# Patient Record
Sex: FEMALE | Race: BLACK OR AFRICAN AMERICAN | NOT HISPANIC OR LATINO | Employment: OTHER | URBAN - METROPOLITAN AREA
[De-identification: names, ages, dates, MRNs, and addresses within clinical notes are randomized per-mention and may not be internally consistent; named-entity substitution may affect disease eponyms.]

---

## 2017-08-11 ENCOUNTER — HOSPITAL ENCOUNTER (OUTPATIENT)
Dept: CARDIOLOGY | Facility: HOSPITAL | Age: 62
Discharge: HOME | End: 2017-08-11
Attending: INTERNAL MEDICINE | Admitting: INTERNAL MEDICINE

## 2017-10-18 ENCOUNTER — HOSPITAL ENCOUNTER (OUTPATIENT)
Dept: MAMMOGRAPHY | Facility: HOSPITAL | Age: 62
Discharge: HOME | End: 2017-10-18
Attending: NURSE PRACTITIONER | Admitting: NURSE PRACTITIONER

## 2018-01-01 ENCOUNTER — APPOINTMENT (OUTPATIENT)
Dept: CT IMAGING | Facility: HOSPITAL | Age: 63
End: 2018-01-01

## 2018-01-01 ENCOUNTER — HOSPITAL ENCOUNTER (INPATIENT)
Facility: HOSPITAL | Age: 63
LOS: 3 days | Discharge: HOSPICE | End: 2018-11-08
Attending: INTERNAL MEDICINE | Admitting: INTERNAL MEDICINE

## 2018-01-01 ENCOUNTER — APPOINTMENT (OUTPATIENT)
Dept: CARDIOLOGY | Facility: HOSPITAL | Age: 63
End: 2018-01-01
Attending: INTERNAL MEDICINE

## 2018-01-01 ENCOUNTER — HOSPITAL ENCOUNTER (INPATIENT)
Facility: HOSPITAL | Age: 63
LOS: 11 days | End: 2018-11-19
Attending: INTERNAL MEDICINE | Admitting: INTERNAL MEDICINE

## 2018-01-01 VITALS
OXYGEN SATURATION: 95 % | HEART RATE: 81 BPM | RESPIRATION RATE: 10 BRPM | TEMPERATURE: 100.4 F | BODY MASS INDEX: 27.48 KG/M2 | SYSTOLIC BLOOD PRESSURE: 131 MMHG | WEIGHT: 140 LBS | DIASTOLIC BLOOD PRESSURE: 86 MMHG | HEIGHT: 60 IN

## 2018-01-01 VITALS — SYSTOLIC BLOOD PRESSURE: 104 MMHG | TEMPERATURE: 97.9 F | DIASTOLIC BLOOD PRESSURE: 75 MMHG

## 2018-01-01 LAB
ALBUMIN SERPL-MCNC: 3.7 G/DL (ref 3.5–5.2)
ALBUMIN/GLOB SERPL: 0.8 G/DL
ALP SERPL-CCNC: 102 U/L (ref 39–117)
ALT SERPL W P-5'-P-CCNC: 15 U/L (ref 1–33)
ANION GAP SERPL CALCULATED.3IONS-SCNC: 13.9 MMOL/L
AORTIC DIMENSIONLESS INDEX: 0.6 (DI)
AST SERPL-CCNC: 17 U/L (ref 1–32)
BASOPHILS # BLD AUTO: 0.02 10*3/MM3 (ref 0–0.2)
BASOPHILS NFR BLD AUTO: 0.2 % (ref 0–1.5)
BH CV ECHO MEAS - AO MAX PG: 19 MMHG
BH CV ECHO MEAS - AO MEAN PG: 10 MMHG
BH CV ECHO MEAS - AO V2 MAX: 222 CM/SEC
BH CV ECHO MEAS - AO V2 VTI: 49 CM
BH CV ECHO MEAS - EDV(MOD-SP2): 32 ML
BH CV ECHO MEAS - EDV(MOD-SP4): 64 ML
BH CV ECHO MEAS - EF(MOD-BP): 57 %
BH CV ECHO MEAS - EF(MOD-SP4): 56.3 %
BH CV ECHO MEAS - ESV(MOD-SP2): 60.5 ML
BH CV ECHO MEAS - ESV(MOD-SP4): 28 ML
BH CV ECHO MEAS - FS: 32 %
BH CV ECHO MEAS - IVS/LVPW: 0.8 CM
BH CV ECHO MEAS - IVSD: 0.8 CM
BH CV ECHO MEAS - LA DIMENSION: 1.8 CM
BH CV ECHO MEAS - LAT PEAK E' VEL: 7 CM/SEC
BH CV ECHO MEAS - LV MAX PG: 5.5 MMHG
BH CV ECHO MEAS - LV MEAN PG: 3 MMHG
BH CV ECHO MEAS - LV V1 MAX: 117 CM/SEC
BH CV ECHO MEAS - LV V1 VTI: 29 CM
BH CV ECHO MEAS - LVIDD: 4.1 CM
BH CV ECHO MEAS - LVIDS: 2.8 CM
BH CV ECHO MEAS - LVOT DIAM: 1.9 CM
BH CV ECHO MEAS - LVPWD: 1 CM
BH CV ECHO MEAS - MED PEAK E' VEL: 5 CM/SEC
BH CV ECHO MEAS - MV A DUR: 0.2 SEC
BH CV ECHO MEAS - MV A MAX VEL: 87.9 CM/SEC
BH CV ECHO MEAS - MV DEC SLOPE: 186 CM/SEC2
BH CV ECHO MEAS - MV DEC TIME: 0 MSEC
BH CV ECHO MEAS - MV E MAX VEL: 66.1 CM/SEC
BH CV ECHO MEAS - MV E/A: 0.8
BH CV ECHO MEAS - MV MAX PG: 3 MMHG
BH CV ECHO MEAS - MV MEAN PG: 1 MMHG
BH CV ECHO MEAS - MV P1/2T: 100 MSEC
BH CV ECHO MEAS - MV V2 VTI: 83 CM
BH CV ECHO MEAS - PA V2 MAX: 83 CM/SEC
BH CV ECHO MEAS - RAP SYSTOLE: 3 MMHG
BH CV ECHO MEAS - RV V1 MAX: 17 CM/SEC
BH CV ECHO MEAS - RVOT DIAM: 2.2 CM
BH CV ECHO MEAS - RVSP: 35 MMHG
BH CV ECHO MEAS - SI(MOD-SP2): 31.5 ML/M2
BH CV ECHO MEAS - SV(MOD-SP2): 49 ML
BH CV ECHO MEAS - SV(MOD-SP4): 23.2 ML
BH CV ECHO MEAS - TAPSE (>1.6): 1.4 CM2
BH CV ECHO MEAS - TR MAX PG: 32
BH CV ECHO MEAS - TR MAX VEL: 284 CM/SEC
BH CV ECHO MEASUREMENTS AVERAGE E/E' RATIO: 11.02
BH CV VAS BP RIGHT ARM: NORMAL MMHG
BH CV XLRA - RV BASE: 3.3 CM
BILIRUB SERPL-MCNC: 0.4 MG/DL (ref 0.1–1.2)
BUN BLD-MCNC: 9 MG/DL (ref 8–23)
BUN/CREAT SERPL: 11.4 (ref 7–25)
CALCIUM SPEC-SCNC: 8.9 MG/DL (ref 8.6–10.5)
CHLORIDE SERPL-SCNC: 100 MMOL/L (ref 98–107)
CHOLEST SERPL-MCNC: 182 MG/DL (ref 0–200)
CO2 SERPL-SCNC: 22.1 MMOL/L (ref 22–29)
CREAT BLD-MCNC: 0.79 MG/DL (ref 0.57–1)
DEPRECATED RDW RBC AUTO: 49.5 FL (ref 37–54)
EOSINOPHIL # BLD AUTO: 0.04 10*3/MM3 (ref 0–0.7)
EOSINOPHIL NFR BLD AUTO: 0.3 % (ref 0.3–6.2)
ERYTHROCYTE [DISTWIDTH] IN BLOOD BY AUTOMATED COUNT: 15 % (ref 11.7–13)
GFR SERPL CREATININE-BSD FRML MDRD: 73 ML/MIN/1.73
GFR SERPL CREATININE-BSD FRML MDRD: 89 ML/MIN/1.73
GLOBULIN UR ELPH-MCNC: 4.7 GM/DL
GLUCOSE BLD-MCNC: 88 MG/DL (ref 65–99)
GLUCOSE BLDC GLUCOMTR-MCNC: 104 MG/DL (ref 70–130)
GLUCOSE BLDC GLUCOMTR-MCNC: 85 MG/DL (ref 70–130)
GLUCOSE BLDC GLUCOMTR-MCNC: 93 MG/DL (ref 70–130)
HBA1C MFR BLD: 5.4 % (ref 4.8–5.6)
HCT VFR BLD AUTO: 43 % (ref 35.6–45.5)
HDLC SERPL-MCNC: 55 MG/DL (ref 40–60)
HGB BLD-MCNC: 14.3 G/DL (ref 11.9–15.5)
IMM GRANULOCYTES # BLD: 0.04 10*3/MM3 (ref 0–0.03)
IMM GRANULOCYTES NFR BLD: 0.3 % (ref 0–0.5)
LDLC SERPL CALC-MCNC: 116 MG/DL (ref 0–100)
LDLC/HDLC SERPL: 2.11 {RATIO}
LEFT ATRIUM VOLUME INDEX: 19 ML/M2
LV EF 2D ECHO EST: 57 %
LYMPHOCYTES # BLD AUTO: 2.26 10*3/MM3 (ref 0.9–4.8)
LYMPHOCYTES NFR BLD AUTO: 18.1 % (ref 19.6–45.3)
MAXIMAL PREDICTED HEART RATE: 155 BPM
MCH RBC QN AUTO: 29.7 PG (ref 26.9–32)
MCHC RBC AUTO-ENTMCNC: 33.3 G/DL (ref 32.4–36.3)
MCV RBC AUTO: 89.4 FL (ref 80.5–98.2)
MONOCYTES # BLD AUTO: 0.9 10*3/MM3 (ref 0.2–1.2)
MONOCYTES NFR BLD AUTO: 7.2 % (ref 5–12)
NEUTROPHILS # BLD AUTO: 9.26 10*3/MM3 (ref 1.9–8.1)
NEUTROPHILS NFR BLD AUTO: 74.2 % (ref 42.7–76)
NRBC BLD MANUAL-RTO: 0 /100 WBC (ref 0–0)
PLATELET # BLD AUTO: 259 10*3/MM3 (ref 140–500)
PMV BLD AUTO: 9.4 FL (ref 6–12)
POTASSIUM BLD-SCNC: 3.5 MMOL/L (ref 3.5–5.2)
PROT SERPL-MCNC: 8.4 G/DL (ref 6–8.5)
RBC # BLD AUTO: 4.81 10*6/MM3 (ref 3.9–5.2)
SODIUM BLD-SCNC: 136 MMOL/L (ref 136–145)
STRESS TARGET HR: 132 BPM
TRIGL SERPL-MCNC: 56 MG/DL (ref 0–150)
VLDLC SERPL-MCNC: 11.2 MG/DL (ref 5–40)
WBC NRBC COR # BLD: 12.48 10*3/MM3 (ref 4.5–10.7)

## 2018-01-01 PROCEDURE — 99231 SBSQ HOSP IP/OBS SF/LOW 25: CPT | Performed by: INTERNAL MEDICINE

## 2018-01-01 PROCEDURE — 25010000002 HYDROMORPHONE 1 MG/ML SOLUTION: Performed by: INTERNAL MEDICINE

## 2018-01-01 PROCEDURE — 25010000002 MORPHINE PER 10 MG: Performed by: INTERNAL MEDICINE

## 2018-01-01 PROCEDURE — 82962 GLUCOSE BLOOD TEST: CPT

## 2018-01-01 PROCEDURE — 0042T HC CT CEREBRAL PERFUSION W/WO CONTRAST: CPT

## 2018-01-01 PROCEDURE — 85025 COMPLETE CBC W/AUTO DIFF WBC: CPT | Performed by: INTERNAL MEDICINE

## 2018-01-01 PROCEDURE — 25010000002 LORAZEPAM PER 2 MG: Performed by: INTERNAL MEDICINE

## 2018-01-01 PROCEDURE — 80053 COMPREHEN METABOLIC PANEL: CPT | Performed by: INTERNAL MEDICINE

## 2018-01-01 PROCEDURE — 83036 HEMOGLOBIN GLYCOSYLATED A1C: CPT | Performed by: INTERNAL MEDICINE

## 2018-01-01 PROCEDURE — 70450 CT HEAD/BRAIN W/O DYE: CPT

## 2018-01-01 PROCEDURE — 93306 TTE W/DOPPLER COMPLETE: CPT | Performed by: INTERNAL MEDICINE

## 2018-01-01 PROCEDURE — 93306 TTE W/DOPPLER COMPLETE: CPT

## 2018-01-01 PROCEDURE — 0 IOPAMIDOL PER 1 ML: Performed by: INTERNAL MEDICINE

## 2018-01-01 PROCEDURE — 99233 SBSQ HOSP IP/OBS HIGH 50: CPT | Performed by: PSYCHIATRY & NEUROLOGY

## 2018-01-01 PROCEDURE — 80061 LIPID PANEL: CPT | Performed by: INTERNAL MEDICINE

## 2018-01-01 PROCEDURE — 99222 1ST HOSP IP/OBS MODERATE 55: CPT | Performed by: INTERNAL MEDICINE

## 2018-01-01 PROCEDURE — 99223 1ST HOSP IP/OBS HIGH 75: CPT | Performed by: PSYCHIATRY & NEUROLOGY

## 2018-01-01 RX ORDER — LORAZEPAM 2 MG/ML
1 INJECTION INTRAMUSCULAR
Status: DISCONTINUED | OUTPATIENT
Start: 2018-01-01 | End: 2018-01-01 | Stop reason: HOSPADM

## 2018-01-01 RX ORDER — SPIRONOLACTONE 25 MG/1
25 TABLET ORAL DAILY
COMMUNITY

## 2018-01-01 RX ORDER — LORAZEPAM 2 MG/ML
2 CONCENTRATE ORAL
Status: CANCELLED | OUTPATIENT
Start: 2018-01-01 | End: 2018-01-01

## 2018-01-01 RX ORDER — LORAZEPAM 2 MG/ML
0.5 INJECTION INTRAMUSCULAR
Status: DISCONTINUED | OUTPATIENT
Start: 2018-01-01 | End: 2018-01-01 | Stop reason: HOSPADM

## 2018-01-01 RX ORDER — LORAZEPAM 2 MG/ML
2 INJECTION INTRAMUSCULAR
Status: DISCONTINUED | OUTPATIENT
Start: 2018-01-01 | End: 2018-01-01 | Stop reason: HOSPADM

## 2018-01-01 RX ORDER — DIPHENOXYLATE HYDROCHLORIDE AND ATROPINE SULFATE 2.5; .025 MG/1; MG/1
1 TABLET ORAL
Status: CANCELLED | OUTPATIENT
Start: 2018-01-01

## 2018-01-01 RX ORDER — SODIUM CHLORIDE 0.9 % (FLUSH) 0.9 %
3 SYRINGE (ML) INJECTION EVERY 12 HOURS SCHEDULED
Status: DISCONTINUED | OUTPATIENT
Start: 2018-01-01 | End: 2018-01-01 | Stop reason: HOSPADM

## 2018-01-01 RX ORDER — LORAZEPAM 2 MG/ML
1 INJECTION INTRAMUSCULAR
Status: DISPENSED | OUTPATIENT
Start: 2018-01-01 | End: 2018-01-01

## 2018-01-01 RX ORDER — GLYCOPYRROLATE 0.2 MG/ML
0.4 INJECTION INTRAMUSCULAR; INTRAVENOUS
Status: DISCONTINUED | OUTPATIENT
Start: 2018-01-01 | End: 2018-01-01 | Stop reason: HOSPADM

## 2018-01-01 RX ORDER — MORPHINE SULFATE 20 MG/ML
10 SOLUTION ORAL
Status: ACTIVE | OUTPATIENT
Start: 2018-01-01 | End: 2018-01-01

## 2018-01-01 RX ORDER — LORAZEPAM 2 MG/ML
0.5 CONCENTRATE ORAL
Status: ACTIVE | OUTPATIENT
Start: 2018-01-01 | End: 2018-01-01

## 2018-01-01 RX ORDER — LORAZEPAM 2 MG/ML
1 INJECTION INTRAMUSCULAR
Status: CANCELLED | OUTPATIENT
Start: 2018-01-01 | End: 2018-01-01

## 2018-01-01 RX ORDER — MORPHINE SULFATE 20 MG/ML
20 SOLUTION ORAL
Status: DISCONTINUED | OUTPATIENT
Start: 2018-01-01 | End: 2018-01-01 | Stop reason: HOSPADM

## 2018-01-01 RX ORDER — ACETAMINOPHEN 650 MG/1
650 SUPPOSITORY RECTAL EVERY 4 HOURS PRN
Status: DISCONTINUED | OUTPATIENT
Start: 2018-01-01 | End: 2018-01-01 | Stop reason: HOSPADM

## 2018-01-01 RX ORDER — ACETAMINOPHEN 325 MG/1
650 TABLET ORAL EVERY 4 HOURS PRN
Status: CANCELLED | OUTPATIENT
Start: 2018-01-01

## 2018-01-01 RX ORDER — SODIUM CHLORIDE 0.9 % (FLUSH) 0.9 %
3 SYRINGE (ML) INJECTION EVERY 12 HOURS SCHEDULED
Status: CANCELLED | OUTPATIENT
Start: 2018-01-01

## 2018-01-01 RX ORDER — LORAZEPAM 2 MG/ML
0.5 CONCENTRATE ORAL
Status: DISCONTINUED | OUTPATIENT
Start: 2018-01-01 | End: 2018-01-01 | Stop reason: HOSPADM

## 2018-01-01 RX ORDER — SODIUM CHLORIDE 0.9 % (FLUSH) 0.9 %
3-10 SYRINGE (ML) INJECTION AS NEEDED
Status: CANCELLED | OUTPATIENT
Start: 2018-01-01

## 2018-01-01 RX ORDER — MORPHINE SULFATE 20 MG/ML
5 SOLUTION ORAL
Status: DISCONTINUED | OUTPATIENT
Start: 2018-01-01 | End: 2018-01-01 | Stop reason: HOSPADM

## 2018-01-01 RX ORDER — LORAZEPAM 2 MG/ML
0.5 CONCENTRATE ORAL
Status: CANCELLED | OUTPATIENT
Start: 2018-01-01 | End: 2018-01-01

## 2018-01-01 RX ORDER — MORPHINE SULFATE 20 MG/ML
20 SOLUTION ORAL
Status: CANCELLED | OUTPATIENT
Start: 2018-01-01 | End: 2018-01-01

## 2018-01-01 RX ORDER — SCOLOPAMINE TRANSDERMAL SYSTEM 1 MG/1
1 PATCH, EXTENDED RELEASE TRANSDERMAL
Status: CANCELLED | OUTPATIENT
Start: 2018-01-01

## 2018-01-01 RX ORDER — LORAZEPAM 2 MG/ML
1 CONCENTRATE ORAL
Status: ACTIVE | OUTPATIENT
Start: 2018-01-01 | End: 2018-01-01

## 2018-01-01 RX ORDER — ACETAMINOPHEN 325 MG/1
650 TABLET ORAL EVERY 4 HOURS PRN
Status: DISCONTINUED | OUTPATIENT
Start: 2018-01-01 | End: 2018-01-01 | Stop reason: HOSPADM

## 2018-01-01 RX ORDER — MORPHINE SULFATE 2 MG/ML
4 INJECTION, SOLUTION INTRAMUSCULAR; INTRAVENOUS
Status: DISCONTINUED | OUTPATIENT
Start: 2018-01-01 | End: 2018-01-01 | Stop reason: HOSPADM

## 2018-01-01 RX ORDER — SCOLOPAMINE TRANSDERMAL SYSTEM 1 MG/1
1 PATCH, EXTENDED RELEASE TRANSDERMAL
Status: DISCONTINUED | OUTPATIENT
Start: 2018-01-01 | End: 2018-01-01 | Stop reason: HOSPADM

## 2018-01-01 RX ORDER — DIPHENOXYLATE HYDROCHLORIDE AND ATROPINE SULFATE 2.5; .025 MG/1; MG/1
1 TABLET ORAL
Status: DISCONTINUED | OUTPATIENT
Start: 2018-01-01 | End: 2018-01-01 | Stop reason: HOSPADM

## 2018-01-01 RX ORDER — SODIUM CHLORIDE 0.9 % (FLUSH) 0.9 %
3-10 SYRINGE (ML) INJECTION AS NEEDED
Status: DISCONTINUED | OUTPATIENT
Start: 2018-01-01 | End: 2018-01-01 | Stop reason: HOSPADM

## 2018-01-01 RX ORDER — MORPHINE SULFATE 10 MG/ML
6 INJECTION INTRAMUSCULAR; INTRAVENOUS; SUBCUTANEOUS
Status: DISCONTINUED | OUTPATIENT
Start: 2018-01-01 | End: 2018-01-01 | Stop reason: HOSPADM

## 2018-01-01 RX ORDER — GLYCOPYRROLATE 0.2 MG/ML
0.4 INJECTION INTRAMUSCULAR; INTRAVENOUS
Status: CANCELLED | OUTPATIENT
Start: 2018-01-01

## 2018-01-01 RX ORDER — MORPHINE SULFATE 2 MG/ML
2 INJECTION, SOLUTION INTRAMUSCULAR; INTRAVENOUS
Status: DISPENSED | OUTPATIENT
Start: 2018-01-01 | End: 2018-01-01

## 2018-01-01 RX ORDER — ACETAMINOPHEN 160 MG/5ML
650 SOLUTION ORAL EVERY 4 HOURS PRN
Status: DISCONTINUED | OUTPATIENT
Start: 2018-01-01 | End: 2018-01-01 | Stop reason: HOSPADM

## 2018-01-01 RX ORDER — GLYCOPYRROLATE 0.2 MG/ML
0.2 INJECTION INTRAMUSCULAR; INTRAVENOUS
Status: DISCONTINUED | OUTPATIENT
Start: 2018-01-01 | End: 2018-01-01 | Stop reason: HOSPADM

## 2018-01-01 RX ORDER — HYDROMORPHONE HYDROCHLORIDE 1 MG/ML
0.5 INJECTION, SOLUTION INTRAMUSCULAR; INTRAVENOUS; SUBCUTANEOUS
Status: DISCONTINUED | OUTPATIENT
Start: 2018-01-01 | End: 2018-01-01 | Stop reason: HOSPADM

## 2018-01-01 RX ORDER — MORPHINE SULFATE 2 MG/ML
4 INJECTION, SOLUTION INTRAMUSCULAR; INTRAVENOUS
Status: CANCELLED | OUTPATIENT
Start: 2018-01-01 | End: 2018-01-01

## 2018-01-01 RX ORDER — FUROSEMIDE 40 MG/1
40 TABLET ORAL DAILY
COMMUNITY

## 2018-01-01 RX ORDER — MORPHINE SULFATE 2 MG/ML
4 INJECTION, SOLUTION INTRAMUSCULAR; INTRAVENOUS
Status: ACTIVE | OUTPATIENT
Start: 2018-01-01 | End: 2018-01-01

## 2018-01-01 RX ORDER — ACETAMINOPHEN 650 MG/1
650 SUPPOSITORY RECTAL EVERY 4 HOURS PRN
Status: CANCELLED | OUTPATIENT
Start: 2018-01-01

## 2018-01-01 RX ORDER — LACOSAMIDE 100 MG/1
100 TABLET ORAL 2 TIMES DAILY
COMMUNITY

## 2018-01-01 RX ORDER — MORPHINE SULFATE 10 MG/ML
6 INJECTION INTRAMUSCULAR; INTRAVENOUS; SUBCUTANEOUS
Status: ACTIVE | OUTPATIENT
Start: 2018-01-01 | End: 2018-01-01

## 2018-01-01 RX ORDER — MORPHINE SULFATE 20 MG/ML
5 SOLUTION ORAL
Status: CANCELLED | OUTPATIENT
Start: 2018-01-01 | End: 2018-01-01

## 2018-01-01 RX ORDER — AMLODIPINE BESYLATE 10 MG/1
10 TABLET ORAL DAILY
COMMUNITY

## 2018-01-01 RX ORDER — WARFARIN SODIUM 1 MG/1
1 TABLET ORAL EVERY OTHER DAY
COMMUNITY

## 2018-01-01 RX ORDER — AMANTADINE HYDROCHLORIDE 100 MG/1
100 CAPSULE, GELATIN COATED ORAL 2 TIMES DAILY
COMMUNITY

## 2018-01-01 RX ORDER — LORAZEPAM 2 MG/ML
2 INJECTION INTRAMUSCULAR
Status: ACTIVE | OUTPATIENT
Start: 2018-01-01 | End: 2018-01-01

## 2018-01-01 RX ORDER — MORPHINE SULFATE 2 MG/ML
2 INJECTION, SOLUTION INTRAMUSCULAR; INTRAVENOUS
Status: DISCONTINUED | OUTPATIENT
Start: 2018-01-01 | End: 2018-01-01 | Stop reason: HOSPADM

## 2018-01-01 RX ORDER — LORAZEPAM 2 MG/ML
1 CONCENTRATE ORAL
Status: DISCONTINUED | OUTPATIENT
Start: 2018-01-01 | End: 2018-01-01 | Stop reason: HOSPADM

## 2018-01-01 RX ORDER — ATORVASTATIN CALCIUM 80 MG/1
80 TABLET, FILM COATED ORAL NIGHTLY
Status: DISCONTINUED | OUTPATIENT
Start: 2018-01-01 | End: 2018-01-01

## 2018-01-01 RX ORDER — GLYCOPYRROLATE 0.2 MG/ML
0.2 INJECTION INTRAMUSCULAR; INTRAVENOUS
Status: CANCELLED | OUTPATIENT
Start: 2018-01-01

## 2018-01-01 RX ORDER — LORAZEPAM 2 MG/ML
0.5 INJECTION INTRAMUSCULAR
Status: ACTIVE | OUTPATIENT
Start: 2018-01-01 | End: 2018-01-01

## 2018-01-01 RX ORDER — SODIUM CHLORIDE 9 MG/ML
75 INJECTION, SOLUTION INTRAVENOUS CONTINUOUS
Status: DISCONTINUED | OUTPATIENT
Start: 2018-01-01 | End: 2018-01-01

## 2018-01-01 RX ORDER — MORPHINE SULFATE 20 MG/ML
20 SOLUTION ORAL
Status: ACTIVE | OUTPATIENT
Start: 2018-01-01 | End: 2018-01-01

## 2018-01-01 RX ORDER — MORPHINE SULFATE 20 MG/ML
10 SOLUTION ORAL
Status: CANCELLED | OUTPATIENT
Start: 2018-01-01 | End: 2018-01-01

## 2018-01-01 RX ORDER — ASPIRIN 300 MG/1
300 SUPPOSITORY RECTAL DAILY
Status: DISCONTINUED | OUTPATIENT
Start: 2018-01-01 | End: 2018-01-01

## 2018-01-01 RX ORDER — ACETAMINOPHEN 160 MG/5ML
650 SOLUTION ORAL EVERY 4 HOURS PRN
Status: CANCELLED | OUTPATIENT
Start: 2018-01-01

## 2018-01-01 RX ORDER — LORAZEPAM 2 MG/ML
2 CONCENTRATE ORAL
Status: DISCONTINUED | OUTPATIENT
Start: 2018-01-01 | End: 2018-01-01 | Stop reason: HOSPADM

## 2018-01-01 RX ORDER — MORPHINE SULFATE 20 MG/ML
10 SOLUTION ORAL
Status: DISCONTINUED | OUTPATIENT
Start: 2018-01-01 | End: 2018-01-01 | Stop reason: HOSPADM

## 2018-01-01 RX ORDER — ASPIRIN 325 MG
325 TABLET ORAL DAILY
Status: DISCONTINUED | OUTPATIENT
Start: 2018-01-01 | End: 2018-01-01

## 2018-01-01 RX ORDER — MORPHINE SULFATE 2 MG/ML
2 INJECTION, SOLUTION INTRAMUSCULAR; INTRAVENOUS
Status: CANCELLED | OUTPATIENT
Start: 2018-01-01 | End: 2018-01-01

## 2018-01-01 RX ORDER — SIMVASTATIN 20 MG
20 TABLET ORAL DAILY
COMMUNITY

## 2018-01-01 RX ORDER — LORAZEPAM 2 MG/ML
0.5 INJECTION INTRAMUSCULAR
Status: CANCELLED | OUTPATIENT
Start: 2018-01-01 | End: 2018-01-01

## 2018-01-01 RX ORDER — FOLIC ACID 1 MG/1
1 TABLET ORAL DAILY
COMMUNITY

## 2018-01-01 RX ORDER — LORAZEPAM 2 MG/ML
2 CONCENTRATE ORAL
Status: ACTIVE | OUTPATIENT
Start: 2018-01-01 | End: 2018-01-01

## 2018-01-01 RX ORDER — LORAZEPAM 2 MG/ML
1 CONCENTRATE ORAL
Status: CANCELLED | OUTPATIENT
Start: 2018-01-01 | End: 2018-01-01

## 2018-01-01 RX ORDER — MORPHINE SULFATE 20 MG/ML
5 SOLUTION ORAL
Status: ACTIVE | OUTPATIENT
Start: 2018-01-01 | End: 2018-01-01

## 2018-01-01 RX ORDER — POTASSIUM CHLORIDE 750 MG/1
10 CAPSULE, EXTENDED RELEASE ORAL DAILY
COMMUNITY

## 2018-01-01 RX ORDER — MORPHINE SULFATE 10 MG/ML
6 INJECTION INTRAMUSCULAR; INTRAVENOUS; SUBCUTANEOUS
Status: CANCELLED | OUTPATIENT
Start: 2018-01-01 | End: 2018-01-01

## 2018-01-01 RX ORDER — LORAZEPAM 2 MG/ML
2 INJECTION INTRAMUSCULAR
Status: CANCELLED | OUTPATIENT
Start: 2018-01-01 | End: 2018-01-01

## 2018-01-01 RX ADMIN — Medication 3 ML: at 09:21

## 2018-01-01 RX ADMIN — MORPHINE SULFATE 2 MG: 2 INJECTION, SOLUTION INTRAMUSCULAR; INTRAVENOUS at 08:58

## 2018-01-01 RX ADMIN — MORPHINE SULFATE 2 MG: 2 INJECTION, SOLUTION INTRAMUSCULAR; INTRAVENOUS at 17:42

## 2018-01-01 RX ADMIN — MORPHINE SULFATE 2 MG: 2 INJECTION, SOLUTION INTRAMUSCULAR; INTRAVENOUS at 13:01

## 2018-01-01 RX ADMIN — LORAZEPAM 1 MG: 2 INJECTION INTRAMUSCULAR; INTRAVENOUS at 00:28

## 2018-01-01 RX ADMIN — LORAZEPAM 1 MG: 2 INJECTION INTRAMUSCULAR; INTRAVENOUS at 00:52

## 2018-01-01 RX ADMIN — GLYCOPYRROLATE 0.4 MG: 0.2 INJECTION, SOLUTION INTRAMUSCULAR; INTRAVENOUS at 05:31

## 2018-01-01 RX ADMIN — Medication 3 ML: at 07:46

## 2018-01-01 RX ADMIN — Medication 3 ML: at 20:40

## 2018-01-01 RX ADMIN — MORPHINE SULFATE 2 MG: 2 INJECTION, SOLUTION INTRAMUSCULAR; INTRAVENOUS at 21:15

## 2018-01-01 RX ADMIN — GLYCOPYRROLATE 0.4 MG: 0.2 INJECTION, SOLUTION INTRAMUSCULAR; INTRAVENOUS at 16:47

## 2018-01-01 RX ADMIN — MORPHINE SULFATE 2 MG: 2 INJECTION, SOLUTION INTRAMUSCULAR; INTRAVENOUS at 13:09

## 2018-01-01 RX ADMIN — LORAZEPAM 1 MG: 2 INJECTION INTRAMUSCULAR; INTRAVENOUS at 13:05

## 2018-01-01 RX ADMIN — GLYCOPYRROLATE 0.4 MG: 0.2 INJECTION, SOLUTION INTRAMUSCULAR; INTRAVENOUS at 14:30

## 2018-01-01 RX ADMIN — GLYCOPYRROLATE 0.4 MG: 0.2 INJECTION, SOLUTION INTRAMUSCULAR; INTRAVENOUS at 07:42

## 2018-01-01 RX ADMIN — MORPHINE SULFATE 2 MG: 2 INJECTION, SOLUTION INTRAMUSCULAR; INTRAVENOUS at 01:19

## 2018-01-01 RX ADMIN — MORPHINE SULFATE 2 MG: 2 INJECTION, SOLUTION INTRAMUSCULAR; INTRAVENOUS at 16:53

## 2018-01-01 RX ADMIN — GLYCOPYRROLATE 0.4 MG: 0.2 INJECTION, SOLUTION INTRAMUSCULAR; INTRAVENOUS at 04:44

## 2018-01-01 RX ADMIN — LORAZEPAM 1 MG: 2 INJECTION INTRAMUSCULAR; INTRAVENOUS at 07:39

## 2018-01-01 RX ADMIN — LORAZEPAM 1 MG: 2 INJECTION INTRAMUSCULAR; INTRAVENOUS at 16:53

## 2018-01-01 RX ADMIN — GLYCOPYRROLATE 0.4 MG: 0.2 INJECTION, SOLUTION INTRAMUSCULAR; INTRAVENOUS at 13:04

## 2018-01-01 RX ADMIN — GLYCOPYRROLATE 0.4 MG: 0.2 INJECTION, SOLUTION INTRAMUSCULAR; INTRAVENOUS at 22:36

## 2018-01-01 RX ADMIN — GLYCOPYRROLATE 0.4 MG: 0.2 INJECTION, SOLUTION INTRAMUSCULAR; INTRAVENOUS at 08:59

## 2018-01-01 RX ADMIN — Medication 3 ML: at 09:14

## 2018-01-01 RX ADMIN — MORPHINE SULFATE 2 MG: 2 INJECTION, SOLUTION INTRAMUSCULAR; INTRAVENOUS at 09:00

## 2018-01-01 RX ADMIN — MORPHINE SULFATE 2 MG: 2 INJECTION, SOLUTION INTRAMUSCULAR; INTRAVENOUS at 20:40

## 2018-01-01 RX ADMIN — GLYCOPYRROLATE 0.4 MG: 0.2 INJECTION, SOLUTION INTRAMUSCULAR; INTRAVENOUS at 00:52

## 2018-01-01 RX ADMIN — GLYCOPYRROLATE 0.4 MG: 0.2 INJECTION, SOLUTION INTRAMUSCULAR; INTRAVENOUS at 09:31

## 2018-01-01 RX ADMIN — MORPHINE SULFATE 2 MG: 2 INJECTION, SOLUTION INTRAMUSCULAR; INTRAVENOUS at 13:33

## 2018-01-01 RX ADMIN — MORPHINE SULFATE 2 MG: 2 INJECTION, SOLUTION INTRAMUSCULAR; INTRAVENOUS at 12:31

## 2018-01-01 RX ADMIN — LORAZEPAM 1 MG: 2 INJECTION INTRAMUSCULAR; INTRAVENOUS at 12:56

## 2018-01-01 RX ADMIN — MORPHINE SULFATE 2 MG: 2 INJECTION, SOLUTION INTRAMUSCULAR; INTRAVENOUS at 16:52

## 2018-01-01 RX ADMIN — LORAZEPAM 1 MG: 2 INJECTION INTRAMUSCULAR; INTRAVENOUS at 13:52

## 2018-01-01 RX ADMIN — SCOPALAMINE 1 PATCH: 1 PATCH, EXTENDED RELEASE TRANSDERMAL at 10:17

## 2018-01-01 RX ADMIN — LORAZEPAM 1 MG: 2 INJECTION INTRAMUSCULAR; INTRAVENOUS at 02:48

## 2018-01-01 RX ADMIN — MORPHINE SULFATE 2 MG: 2 INJECTION, SOLUTION INTRAMUSCULAR; INTRAVENOUS at 04:46

## 2018-01-01 RX ADMIN — Medication 3 ML: at 21:30

## 2018-01-01 RX ADMIN — LORAZEPAM 1 MG: 2 INJECTION INTRAMUSCULAR; INTRAVENOUS at 01:19

## 2018-01-01 RX ADMIN — MORPHINE SULFATE 2 MG: 2 INJECTION, SOLUTION INTRAMUSCULAR; INTRAVENOUS at 06:14

## 2018-01-01 RX ADMIN — GLYCOPYRROLATE 0.4 MG: 0.2 INJECTION, SOLUTION INTRAMUSCULAR; INTRAVENOUS at 20:47

## 2018-01-01 RX ADMIN — LORAZEPAM 1 MG: 2 INJECTION INTRAMUSCULAR; INTRAVENOUS at 16:52

## 2018-01-01 RX ADMIN — LORAZEPAM 1 MG: 2 INJECTION INTRAMUSCULAR; INTRAVENOUS at 04:44

## 2018-01-01 RX ADMIN — GLYCOPYRROLATE 0.4 MG: 0.2 INJECTION, SOLUTION INTRAMUSCULAR; INTRAVENOUS at 13:40

## 2018-01-01 RX ADMIN — GLYCOPYRROLATE 0.4 MG: 0.2 INJECTION, SOLUTION INTRAMUSCULAR; INTRAVENOUS at 07:39

## 2018-01-01 RX ADMIN — Medication 10 ML: at 09:04

## 2018-01-01 RX ADMIN — MORPHINE SULFATE 2 MG: 2 INJECTION, SOLUTION INTRAMUSCULAR; INTRAVENOUS at 17:49

## 2018-01-01 RX ADMIN — GLYCOPYRROLATE 0.2 MG: 0.2 INJECTION, SOLUTION INTRAMUSCULAR; INTRAVENOUS at 08:33

## 2018-01-01 RX ADMIN — Medication 3 ML: at 09:13

## 2018-01-01 RX ADMIN — Medication 3 ML: at 08:33

## 2018-01-01 RX ADMIN — MORPHINE SULFATE 2 MG: 2 INJECTION, SOLUTION INTRAMUSCULAR; INTRAVENOUS at 17:04

## 2018-01-01 RX ADMIN — GLYCOPYRROLATE 0.4 MG: 0.2 INJECTION, SOLUTION INTRAMUSCULAR; INTRAVENOUS at 17:14

## 2018-01-01 RX ADMIN — MORPHINE SULFATE 2 MG: 2 INJECTION, SOLUTION INTRAMUSCULAR; INTRAVENOUS at 17:30

## 2018-01-01 RX ADMIN — GLYCOPYRROLATE 0.4 MG: 0.2 INJECTION, SOLUTION INTRAMUSCULAR; INTRAVENOUS at 09:13

## 2018-01-01 RX ADMIN — MORPHINE SULFATE 2 MG: 2 INJECTION, SOLUTION INTRAMUSCULAR; INTRAVENOUS at 07:42

## 2018-01-01 RX ADMIN — ASPIRIN 300 MG: 300 SUPPOSITORY RECTAL at 10:40

## 2018-01-01 RX ADMIN — GLYCOPYRROLATE 0.4 MG: 0.2 INJECTION, SOLUTION INTRAMUSCULAR; INTRAVENOUS at 00:20

## 2018-01-01 RX ADMIN — MORPHINE SULFATE 2 MG: 2 INJECTION, SOLUTION INTRAMUSCULAR; INTRAVENOUS at 08:33

## 2018-01-01 RX ADMIN — LORAZEPAM 1 MG: 2 INJECTION INTRAMUSCULAR; INTRAVENOUS at 04:46

## 2018-01-01 RX ADMIN — MORPHINE SULFATE 2 MG: 2 INJECTION, SOLUTION INTRAMUSCULAR; INTRAVENOUS at 00:42

## 2018-01-01 RX ADMIN — SCOPOLAMINE 1 PATCH: 1 PATCH, EXTENDED RELEASE TRANSDERMAL at 14:30

## 2018-01-01 RX ADMIN — MORPHINE SULFATE 2 MG: 2 INJECTION, SOLUTION INTRAMUSCULAR; INTRAVENOUS at 21:08

## 2018-01-01 RX ADMIN — MORPHINE SULFATE 2 MG: 2 INJECTION, SOLUTION INTRAMUSCULAR; INTRAVENOUS at 20:44

## 2018-01-01 RX ADMIN — MORPHINE SULFATE 4 MG: 2 INJECTION, SOLUTION INTRAMUSCULAR; INTRAVENOUS at 02:48

## 2018-01-01 RX ADMIN — MORPHINE SULFATE 2 MG: 2 INJECTION, SOLUTION INTRAMUSCULAR; INTRAVENOUS at 01:00

## 2018-01-01 RX ADMIN — LORAZEPAM 1 MG: 2 INJECTION INTRAMUSCULAR; INTRAVENOUS at 20:44

## 2018-01-01 RX ADMIN — Medication 3 ML: at 09:30

## 2018-01-01 RX ADMIN — MORPHINE SULFATE 2 MG: 2 INJECTION, SOLUTION INTRAMUSCULAR; INTRAVENOUS at 05:13

## 2018-01-01 RX ADMIN — GLYCOPYRROLATE 0.4 MG: 0.2 INJECTION, SOLUTION INTRAMUSCULAR; INTRAVENOUS at 00:41

## 2018-01-01 RX ADMIN — Medication 3 ML: at 20:36

## 2018-01-01 RX ADMIN — MORPHINE SULFATE 2 MG: 2 INJECTION, SOLUTION INTRAMUSCULAR; INTRAVENOUS at 12:56

## 2018-01-01 RX ADMIN — MORPHINE SULFATE 2 MG: 2 INJECTION, SOLUTION INTRAMUSCULAR; INTRAVENOUS at 09:13

## 2018-01-01 RX ADMIN — Medication 3 ML: at 22:12

## 2018-01-01 RX ADMIN — GLYCOPYRROLATE 0.4 MG: 0.2 INJECTION, SOLUTION INTRAMUSCULAR; INTRAVENOUS at 17:04

## 2018-01-01 RX ADMIN — GLYCOPYRROLATE 0.4 MG: 0.2 INJECTION, SOLUTION INTRAMUSCULAR; INTRAVENOUS at 04:28

## 2018-01-01 RX ADMIN — LORAZEPAM 1 MG: 2 INJECTION INTRAMUSCULAR; INTRAVENOUS at 00:25

## 2018-01-01 RX ADMIN — MORPHINE SULFATE 2 MG: 2 INJECTION, SOLUTION INTRAMUSCULAR; INTRAVENOUS at 05:32

## 2018-01-01 RX ADMIN — Medication 3 ML: at 08:00

## 2018-01-01 RX ADMIN — GLYCOPYRROLATE 0.4 MG: 0.2 INJECTION, SOLUTION INTRAMUSCULAR; INTRAVENOUS at 20:35

## 2018-01-01 RX ADMIN — MORPHINE SULFATE 2 MG: 2 INJECTION, SOLUTION INTRAMUSCULAR; INTRAVENOUS at 13:45

## 2018-01-01 RX ADMIN — LORAZEPAM 1 MG: 2 INJECTION INTRAMUSCULAR; INTRAVENOUS at 04:45

## 2018-01-01 RX ADMIN — MORPHINE SULFATE 2 MG: 2 INJECTION, SOLUTION INTRAMUSCULAR; INTRAVENOUS at 07:39

## 2018-01-01 RX ADMIN — GLYCOPYRROLATE 0.4 MG: 0.2 INJECTION, SOLUTION INTRAMUSCULAR; INTRAVENOUS at 05:14

## 2018-01-01 RX ADMIN — MORPHINE SULFATE 2 MG: 2 INJECTION, SOLUTION INTRAMUSCULAR; INTRAVENOUS at 04:27

## 2018-01-01 RX ADMIN — SCOPALAMINE 1 PATCH: 1 PATCH, EXTENDED RELEASE TRANSDERMAL at 09:04

## 2018-01-01 RX ADMIN — LORAZEPAM 1 MG: 2 INJECTION INTRAMUSCULAR; INTRAVENOUS at 20:40

## 2018-01-01 RX ADMIN — Medication 3 ML: at 21:15

## 2018-01-01 RX ADMIN — MORPHINE SULFATE 2 MG: 2 INJECTION, SOLUTION INTRAMUSCULAR; INTRAVENOUS at 05:22

## 2018-01-01 RX ADMIN — LORAZEPAM 1 MG: 2 INJECTION INTRAMUSCULAR; INTRAVENOUS at 04:28

## 2018-01-01 RX ADMIN — LORAZEPAM 1 MG: 2 INJECTION INTRAMUSCULAR; INTRAVENOUS at 16:47

## 2018-01-01 RX ADMIN — GLYCOPYRROLATE 0.4 MG: 0.2 INJECTION, SOLUTION INTRAMUSCULAR; INTRAVENOUS at 09:04

## 2018-01-01 RX ADMIN — MORPHINE SULFATE 2 MG: 2 INJECTION, SOLUTION INTRAMUSCULAR; INTRAVENOUS at 20:35

## 2018-01-01 RX ADMIN — GLYCOPYRROLATE 0.4 MG: 0.2 INJECTION, SOLUTION INTRAMUSCULAR; INTRAVENOUS at 13:34

## 2018-01-01 RX ADMIN — LORAZEPAM 1 MG: 2 INJECTION INTRAMUSCULAR; INTRAVENOUS at 21:15

## 2018-01-01 RX ADMIN — MORPHINE SULFATE 2 MG: 2 INJECTION, SOLUTION INTRAMUSCULAR; INTRAVENOUS at 00:29

## 2018-01-01 RX ADMIN — MORPHINE SULFATE 2 MG: 2 INJECTION, SOLUTION INTRAMUSCULAR; INTRAVENOUS at 04:44

## 2018-01-01 RX ADMIN — LORAZEPAM 1 MG: 2 INJECTION INTRAMUSCULAR; INTRAVENOUS at 17:30

## 2018-01-01 RX ADMIN — MORPHINE SULFATE 2 MG: 2 INJECTION, SOLUTION INTRAMUSCULAR; INTRAVENOUS at 00:34

## 2018-01-01 RX ADMIN — MORPHINE SULFATE 2 MG: 2 INJECTION, SOLUTION INTRAMUSCULAR; INTRAVENOUS at 00:48

## 2018-01-01 RX ADMIN — GLYCOPYRROLATE 0.4 MG: 0.2 INJECTION, SOLUTION INTRAMUSCULAR; INTRAVENOUS at 17:30

## 2018-01-01 RX ADMIN — LORAZEPAM 1 MG: 2 INJECTION INTRAMUSCULAR; INTRAVENOUS at 09:13

## 2018-01-01 RX ADMIN — MORPHINE SULFATE 2 MG: 2 INJECTION, SOLUTION INTRAMUSCULAR; INTRAVENOUS at 04:45

## 2018-01-01 RX ADMIN — SODIUM CHLORIDE 75 ML/HR: 9 INJECTION, SOLUTION INTRAVENOUS at 15:45

## 2018-01-01 RX ADMIN — GLYCOPYRROLATE 0.4 MG: 0.2 INJECTION, SOLUTION INTRAMUSCULAR; INTRAVENOUS at 01:19

## 2018-01-01 RX ADMIN — GLYCOPYRROLATE 0.4 MG: 0.2 INJECTION, SOLUTION INTRAMUSCULAR; INTRAVENOUS at 13:02

## 2018-01-01 RX ADMIN — MORPHINE SULFATE 2 MG: 2 INJECTION, SOLUTION INTRAMUSCULAR; INTRAVENOUS at 21:01

## 2018-01-01 RX ADMIN — GLYCOPYRROLATE 0.4 MG: 0.2 INJECTION, SOLUTION INTRAMUSCULAR; INTRAVENOUS at 16:53

## 2018-01-01 RX ADMIN — MORPHINE SULFATE 2 MG: 2 INJECTION, SOLUTION INTRAMUSCULAR; INTRAVENOUS at 04:24

## 2018-01-01 RX ADMIN — MORPHINE SULFATE 2 MG: 2 INJECTION, SOLUTION INTRAMUSCULAR; INTRAVENOUS at 16:47

## 2018-01-01 RX ADMIN — Medication 3 ML: at 08:28

## 2018-01-01 RX ADMIN — LORAZEPAM 1 MG: 2 INJECTION INTRAMUSCULAR; INTRAVENOUS at 09:00

## 2018-01-01 RX ADMIN — Medication 3 ML: at 09:00

## 2018-01-01 RX ADMIN — GLYCOPYRROLATE 0.4 MG: 0.2 INJECTION, SOLUTION INTRAMUSCULAR; INTRAVENOUS at 12:31

## 2018-01-01 RX ADMIN — MORPHINE SULFATE 2 MG: 2 INJECTION, SOLUTION INTRAMUSCULAR; INTRAVENOUS at 00:21

## 2018-01-01 RX ADMIN — LORAZEPAM 1 MG: 2 INJECTION INTRAMUSCULAR; INTRAVENOUS at 08:32

## 2018-01-01 RX ADMIN — MORPHINE SULFATE 2 MG: 2 INJECTION, SOLUTION INTRAMUSCULAR; INTRAVENOUS at 09:31

## 2018-01-01 RX ADMIN — MORPHINE SULFATE 2 MG: 2 INJECTION, SOLUTION INTRAMUSCULAR; INTRAVENOUS at 17:50

## 2018-01-01 RX ADMIN — MORPHINE SULFATE 2 MG: 2 INJECTION, SOLUTION INTRAMUSCULAR; INTRAVENOUS at 13:06

## 2018-01-01 RX ADMIN — MORPHINE SULFATE 2 MG: 2 INJECTION, SOLUTION INTRAMUSCULAR; INTRAVENOUS at 20:02

## 2018-01-01 RX ADMIN — LORAZEPAM 1 MG: 2 INJECTION INTRAMUSCULAR; INTRAVENOUS at 04:23

## 2018-01-01 RX ADMIN — ASPIRIN 300 MG: 300 SUPPOSITORY RECTAL at 20:52

## 2018-01-01 RX ADMIN — Medication 3 ML: at 20:16

## 2018-01-01 RX ADMIN — Medication 10 ML: at 13:40

## 2018-01-01 RX ADMIN — MORPHINE SULFATE 2 MG: 2 INJECTION, SOLUTION INTRAMUSCULAR; INTRAVENOUS at 13:04

## 2018-01-01 RX ADMIN — MORPHINE SULFATE 2 MG: 2 INJECTION, SOLUTION INTRAMUSCULAR; INTRAVENOUS at 09:15

## 2018-01-01 RX ADMIN — GLYCOPYRROLATE 0.4 MG: 0.2 INJECTION, SOLUTION INTRAMUSCULAR; INTRAVENOUS at 13:06

## 2018-01-01 RX ADMIN — MORPHINE SULFATE 2 MG: 2 INJECTION, SOLUTION INTRAMUSCULAR; INTRAVENOUS at 09:04

## 2018-01-01 RX ADMIN — LORAZEPAM 1 MG: 2 INJECTION INTRAMUSCULAR; INTRAVENOUS at 21:47

## 2018-01-01 RX ADMIN — MORPHINE SULFATE 2 MG: 2 INJECTION, SOLUTION INTRAMUSCULAR; INTRAVENOUS at 00:25

## 2018-01-01 RX ADMIN — Medication 3 ML: at 08:25

## 2018-01-01 RX ADMIN — MORPHINE SULFATE 2 MG: 2 INJECTION, SOLUTION INTRAMUSCULAR; INTRAVENOUS at 00:52

## 2018-01-01 RX ADMIN — MORPHINE SULFATE 2 MG: 2 INJECTION, SOLUTION INTRAMUSCULAR; INTRAVENOUS at 04:30

## 2018-01-01 RX ADMIN — MORPHINE SULFATE 2 MG: 2 INJECTION, SOLUTION INTRAMUSCULAR; INTRAVENOUS at 13:34

## 2018-01-01 RX ADMIN — Medication 3 ML: at 08:22

## 2018-01-01 RX ADMIN — Medication 3 ML: at 20:47

## 2018-01-01 RX ADMIN — GLYCOPYRROLATE 0.4 MG: 0.2 INJECTION, SOLUTION INTRAMUSCULAR; INTRAVENOUS at 13:09

## 2018-01-01 RX ADMIN — Medication 3 ML: at 21:08

## 2018-01-01 RX ADMIN — IOPAMIDOL 50 ML: 755 INJECTION, SOLUTION INTRAVENOUS at 13:45

## 2018-01-01 RX ADMIN — GLYCOPYRROLATE 0.4 MG: 0.2 INJECTION, SOLUTION INTRAMUSCULAR; INTRAVENOUS at 10:24

## 2018-01-01 RX ADMIN — MORPHINE SULFATE 2 MG: 2 INJECTION, SOLUTION INTRAMUSCULAR; INTRAVENOUS at 20:47

## 2018-01-01 RX ADMIN — GLYCOPYRROLATE 0.2 MG: 0.2 INJECTION, SOLUTION INTRAMUSCULAR; INTRAVENOUS at 04:24

## 2018-01-01 RX ADMIN — LORAZEPAM 1 MG: 2 INJECTION INTRAMUSCULAR; INTRAVENOUS at 13:33

## 2018-01-01 RX ADMIN — GLYCOPYRROLATE 0.4 MG: 0.2 INJECTION, SOLUTION INTRAMUSCULAR; INTRAVENOUS at 17:49

## 2018-01-01 RX ADMIN — LORAZEPAM 1 MG: 2 INJECTION INTRAMUSCULAR; INTRAVENOUS at 17:14

## 2018-01-01 RX ADMIN — MORPHINE SULFATE 2 MG: 2 INJECTION, SOLUTION INTRAMUSCULAR; INTRAVENOUS at 13:52

## 2018-01-01 RX ADMIN — Medication 3 ML: at 21:00

## 2018-01-01 RX ADMIN — MORPHINE SULFATE 2 MG: 2 INJECTION, SOLUTION INTRAMUSCULAR; INTRAVENOUS at 17:13

## 2018-01-01 RX ADMIN — GLYCOPYRROLATE 0.4 MG: 0.2 INJECTION, SOLUTION INTRAMUSCULAR; INTRAVENOUS at 04:45

## 2018-01-01 RX ADMIN — Medication 3 ML: at 21:41

## 2018-01-01 RX ADMIN — LORAZEPAM 0.5 MG: 2 INJECTION INTRAMUSCULAR; INTRAVENOUS at 20:26

## 2018-01-01 RX ADMIN — GLYCOPYRROLATE 0.4 MG: 0.2 INJECTION, SOLUTION INTRAMUSCULAR; INTRAVENOUS at 20:01

## 2018-01-01 RX ADMIN — GLYCOPYRROLATE 0.4 MG: 0.2 INJECTION, SOLUTION INTRAMUSCULAR; INTRAVENOUS at 12:56

## 2018-01-01 RX ADMIN — LORAZEPAM 1 MG: 2 INJECTION INTRAMUSCULAR; INTRAVENOUS at 13:07

## 2018-01-01 RX ADMIN — LORAZEPAM 1 MG: 2 INJECTION INTRAMUSCULAR; INTRAVENOUS at 13:09

## 2018-01-01 RX ADMIN — GLYCOPYRROLATE 0.4 MG: 0.2 INJECTION, SOLUTION INTRAMUSCULAR; INTRAVENOUS at 21:08

## 2018-01-01 RX ADMIN — LORAZEPAM 1 MG: 2 INJECTION INTRAMUSCULAR; INTRAVENOUS at 21:08

## 2018-01-01 RX ADMIN — LORAZEPAM 1 MG: 2 INJECTION INTRAMUSCULAR; INTRAVENOUS at 09:31

## 2018-01-01 RX ADMIN — GLYCOPYRROLATE 0.4 MG: 0.2 INJECTION, SOLUTION INTRAMUSCULAR; INTRAVENOUS at 00:25

## 2018-01-01 RX ADMIN — Medication 10 ML: at 13:34

## 2018-01-01 RX ADMIN — Medication 3 ML: at 21:35

## 2018-01-01 RX ADMIN — LORAZEPAM 1 MG: 2 INJECTION INTRAMUSCULAR; INTRAVENOUS at 20:47

## 2018-01-01 RX ADMIN — MORPHINE SULFATE 2 MG: 2 INJECTION, SOLUTION INTRAMUSCULAR; INTRAVENOUS at 13:40

## 2018-01-01 RX ADMIN — HYDROMORPHONE HYDROCHLORIDE 1 MG: 1 INJECTION, SOLUTION INTRAMUSCULAR; INTRAVENOUS; SUBCUTANEOUS at 00:41

## 2018-01-01 RX ADMIN — MORPHINE SULFATE 2 MG: 2 INJECTION, SOLUTION INTRAMUSCULAR; INTRAVENOUS at 21:13

## 2018-01-01 RX ADMIN — LORAZEPAM 0.5 MG: 2 INJECTION INTRAMUSCULAR; INTRAVENOUS at 19:38

## 2018-11-05 PROBLEM — I63.9 STROKE (HCC): Status: ACTIVE | Noted: 2018-01-01

## 2018-11-05 NOTE — H&P
[facility]    CC: UTO    HPI:  Mrs. Montez is a 64yo AAF with a history of mechanical aortic valve.  She has a history of subdural hematoma some 6 years ago.  She was brought to the ER at [facility] after being found down and unresponsive by daughter.  She was last seen normal 12 hours ago.  Her INR was 1.1 on presentation to ER.  She was transferred here for possible intervention but was found to have no salvagable tissue so no intervention performed.      PMH:  Mechanical aortic valve  HTN  CAD  Subdural hematoma  Tobacco abuse  H/o alcohol abuse  H/o cocaine abuse    SH:  Current smoker    ROS: UTO  FH: non contributory    Vital signs: bp 143/88; 67; 98; 16; 97%    GEN: appears ill, sleepy, aphasic  HEENT: PERRL, EOMI, no icterus, mmm, no jvd, trachea midline, neck supple  CHEST: CTA bilat, no wheezes, no crackles, no use of accessory muscles  CV: RRR, mechanical click no /g/r  ABD: soft, nt, nd +bs, no hepatosplenomegaly  EXT: no c/c/e  SKIN: no rashes, no xanthomas, nl turgor  LYMPH: no palpable cervical or supraclavicular lymphadenopathy  NEURO: Right arm with increased tone but no volitional movement. Stimulation of left foot with reflexive toe up. Left arm volitional movement. Left leg volitional movemetn.    Neurological: She displays abnormal reflex. A cranial nerve deficit and sensory deficit is present. She exhibits abnormal muscle tone. Coordination abnormal.   Alert, makes eye contact but mute global aphasia, left gaze preference but not deviated tonically, does not blink to right visual field, right facial droop, reacts less to right side sensory stimulation. Absent ankle jerks, right knee brisker than left knee, right biceps brisker than left.   PSYCH: unable to assess  MSK: no kyphoscoliosis, nl muscular development    Labs:  Na 133  K 3.5  Bicarb 25  Cr 1.2  Wbc 8.5  hgb 14.8  plts 277    CT perfusion: large infarct entire left MCA  Ct: complete occulsion of the left  ICA    A/P:  1. Acute CVA -- not a candidate for any intervention.  Likely cause is non compliance with anticoagulation in setting of mechanical heart valve.  Neuro is concerned patient may progress to requiring hemicarniectomy and osmotic control.  Will need to continue q1 hour neurochecks and repeat imaging as ordered by neuro/alexandria.  2. Mechanical heart valve -- will need to resume anticoagulation as soon as safe  3. Blood pressure control, goal as per neuro  4. Tobacco abuse    Discussed with family at bedside and with RN

## 2018-11-05 NOTE — CONSULTS
Consults Neurology - Stroke    Patient Care Team:  Provider, No Known as PCP - General    Chief complaint: Aphasic    Subjective     History of Present Illness  63f with aortic valve replacement on coumadin but with normal INR at outside hospital. Patient normally gets kids off to school but sometimes kids can do it on their own. Last seen normal was last night before bed, roughly 2200. Aphasic, right hemiplegic at [facility], CTA shows LICA occlusion, initial CT showed isointensity left hemisphere and unfortunately CT perfusion here shows a large core deficit of the left MCA/BELKIS distribution.     Review of Systems   Unobtainable due to aphasia    No past medical history on file.  No past surgical history on file.  Social History     Social History   • Marital status: Unknown     Spouse name: N/A   • Number of children: N/A   • Years of education: N/A     Occupational History   • Not on file.     Social History Main Topics   • Smoking status: Not on file   • Smokeless tobacco: Not on file   • Alcohol use Not on file   • Drug use: Unknown   • Sexual activity: Not on file     Other Topics Concern   • Not on file     Social History Narrative   • No narrative on file     No current facility-administered medications on file prior to encounter.      No current outpatient prescriptions on file prior to encounter.     Allergies not on file    Objective      Vital Signs  BP: ()/()   Arterial Line BP: ()/()     Physical Exam   Constitutional: She appears well-developed and well-nourished. No distress.   HENT:   Head: Normocephalic and atraumatic.   Eyes: Pupils are equal, round, and reactive to light. EOM are normal.   Neck: No tracheal deviation present. No thyromegaly present.   Cardiovascular: Normal rate and regular rhythm.    Murmur heard.  Pulmonary/Chest: Effort normal. No respiratory distress.   Abdominal: Soft. Bowel sounds are normal.   Musculoskeletal:   Right arm with increased tone but no volitional movement.  Stimulation of left foot with reflexive toe up. Left arm volitional movement. Left leg volitional movemetn.    Neurological: She displays abnormal reflex. A cranial nerve deficit and sensory deficit is present. She exhibits abnormal muscle tone. Coordination abnormal.   Alert, makes eye contact but mute global aphasia, left gaze preference but not deviated tonically, does not blink to right visual field, right facial droop, reacts less to right side sensory stimulation. Absent ankle jerks, right knee brisker than left knee, right biceps brisker than left.   Skin: Skin is warm and dry. She is not diaphoretic.       Results Review:    I reviewed the patient's new clinical results.  Discussed with treating physicians. Large core deficit, unclear if posterior fossa with lesion but no intervention based on the core deficit.        Assessment/Plan       * No active hospital problems. *      Assessment & Plan     1) Acute left ICA occlusion with stroke of left hemisphere - Patient with very large core deficit and somewhat out of proportion to increased tone on the right. However, consistent with massive left hemisphere stroke. Patient may require hemicraniectomy watch and osmotic control. Currently alert without gaze deviation although preference and increasing tone.    Patient with aortic valve replacement on coumadin but INR only 1.1. Remote SDH. Suspect cardioembolic stroke.     Grim prognosis.    I discussed the patients findings and my recommendations with patient and consulting provider    Gato Lafleur MD  11/05/18  12:58 PM    Time: 45 minutes

## 2018-11-06 NOTE — SIGNIFICANT NOTE
11/06/18 1334   Rehab Time/Intention   Evaluation Not Performed (GRETCHEN Abdul requested to hold PT today.  Plan to attempt 11/7 as appropriate.  )   Rehab Treatment   Discipline physical therapist   Recommendation   PT - Next Appointment 11/07/18

## 2018-11-06 NOTE — SIGNIFICANT NOTE
11/06/18 1356   Rehab Time/Intention   Evaluation Not Performed other (see comments)  (Per Chantell RN, Pt's family viisting anf possible making decision of Pallative Care.  OT will check on the pt. tomorrow.)   Rehab Treatment   Discipline occupational therapist   Recommendation   OT - Next Appointment 11/07/18

## 2018-11-06 NOTE — PROGRESS NOTES
[facility]     Mar/chart reviewed  F/u Acute CVA  Still with aphasia    Vital Sign Min/Max for last 24 hours  Temp  Min: 98.3 °F (36.8 °C)  Max: 98.8 °F (37.1 °C)   BP  Min: 114/76  Max: 154/83   Pulse  Min: 49  Max: 80   Resp  Min: 14  Max: 16   SpO2  Min: 92 %  Max: 100 %   No Data Recorded   Weight  Min: 59 kg (130 lb)  Max: 63.5 kg (140 lb)     Appears ill  perrl, eomi, no icterus,  mmm, no jvd, trachea midline, neck supple,  chest cta bilaterally, no crackles, no wheezes,   rrr, +mechanical heart valve  soft, nt, nd +bs,  no c/c/ e    Labs:  ldl 116  hgba1c 5.4    A/P:  1. Acute CVA -- not a candidate for any intervention.  Likely cause is non compliance with anticoagulation in setting of mechanical heart valve.  Neuro is concerned patient may progress to requiring hemicarniectomy and osmotic control.  Will need to continue q1 hour neurochecks and repeat imaging as ordered by neuro/alexandria.  2. Mechanical heart valve -- will need to resume anticoagulation as soon as safe  3. Blood pressure control, goal as per neuro  4. Tobacco abuse    Await recommendations as per Neurology, needs swallow eval, pt/ot evals.  Will likely need placement

## 2018-11-06 NOTE — PLAN OF CARE
Problem: Stroke (Ischemic) (Adult)  Goal: Signs and Symptoms of Listed Potential Problems Will be Absent, Minimized or Managed (Stroke)  Outcome: Ongoing (interventions implemented as appropriate)   11/06/18 0634   Goal/Outcome Evaluation   Problems Assessed (Stroke (Ischemic)) all   Problems Assessed (Stroke (Ischemic)) situational response       Problem: Skin Injury Risk (Adult)  Goal: Identify Related Risk Factors and Signs and Symptoms  Outcome: Ongoing (interventions implemented as appropriate)   11/06/18 0634   Skin Injury Risk (Adult)   Related Risk Factors (Skin Injury Risk) tissue perfusion altered     Goal: Skin Health and Integrity  Outcome: Ongoing (interventions implemented as appropriate)   11/06/18 0634   Skin Injury Risk (Adult)   Skin Health and Integrity making progress toward outcome       Problem: Fall Risk (Adult)  Goal: Identify Related Risk Factors and Signs and Symptoms  Outcome: Ongoing (interventions implemented as appropriate)   11/06/18 0634   Fall Risk (Adult)   Related Risk Factors (Fall Risk) sensory deficits;environment unfamiliar   Signs and Symptoms (Fall Risk) presence of risk factors     Goal: Absence of Fall  Outcome: Ongoing (interventions implemented as appropriate)   11/06/18 0634   Fall Risk (Adult)   Absence of Fall making progress toward outcome

## 2018-11-06 NOTE — PROGRESS NOTES
Discharge Planning Assessment  [facility]     Patient Name: Babita Reyes  MRN: [id number]  Today's Date: 11/6/2018    Admit Date: 11/5/2018          Discharge Needs Assessment     Row Name 11/06/18 1249       Living Environment    Lives With alone    Current Living Arrangements home/apartment/condo    Primary Care Provided by self    Provides Primary Care For no one    Family Caregiver if Needed child(christopher), adult    Family Caregiver Names Marie Layton [phone]    Able to Return to Prior Arrangements no       Resource/Environmental Concerns    Resource/Environmental Concerns none       Discharge Needs Assessment    Concerns to be Addressed discharge planning;care coordination/care conferences    Current Discharge Risk lives alone            Discharge Plan     Row Name 11/06/18 1253       Plan    Plan TBD    Patient/Family in Agreement with Plan yes    Plan Comments IMM 11/6/2018.  Met with patient at bedside, patient is not responsive.  Spoke with Marie Layton [phone], face sheet reviewed.  Son requesting conversation with neurologist, regarding some questions and clarification.  Will continue to follow for options.          Destination     No service coordination in this encounter.      Durable Medical Equipment     No service coordination in this encounter.      Dialysis/Infusion     No service coordination in this encounter.      Home Medical Care     No service coordination in this encounter.      Social Care     No service coordination in this encounter.                Demographic Summary     Row Name 11/06/18 1246       General Information    Admission Type inpatient    Preferred Language English     Used During This Interaction no       Contact Information    Permission Granted to Share Info With family/designee;power of  for healthcare   Marie Layton [phone]            Functional Status     Row Name 11/06/18 1248       Functional Status    Usual  Activity Tolerance good    Current Activity Tolerance poor       Employment/    Employment Status retired            Psychosocial    No documentation.           Abuse/Neglect    No documentation.           Legal    No documentation.           Substance Abuse    No documentation.           Patient Forms    No documentation.         Luciana Thompson RN

## 2018-11-06 NOTE — PROGRESS NOTES
Neurology - Family Meeting    Family meeting with patient's son and relative. Reviewed that patient had suffered from a massive stroke of the left hemisphere with both BELKIS and MCA territories involved. The initial neuroimaging did not demonstrate a perfusion mismatch. Likely related to aortic valve. Son relates that patient may have been non-compliant with her medicines/blood thinners.    Reviewed neuroimaging with family including the size of the infarct. Kids reported that she was normal yesterday morning and thus the last seen normal was likely 8AM 11/5th. 24 hour CT scan this morning shows some mild mass effect. Peak swelling would be 48 to 72 hours. Described to family that patient is at high risk for death at present. This could occur from swelling and compression of her brainstem but that we could place patient on life support such as intubation and that is our current plan.      Described that if she survives the stroke, she would very likely be awake but that she would have severe impairment of her speech. She may someday be able to understand some and speak some. She would also very likely have severe impairment or paralysis of her right side. She would have great difficulty with toileting and self care although some patients eventually can learn to do these things along with feeding.  Asked if she had any advanced directives and family said she hadn't discussed it.  Right now, full code. Family will discuss about what to do if patient should deteriorate.     Follow-up CT tomorrow.    Gato Lafleur MD MS  Neurology

## 2018-11-06 NOTE — NURSING NOTE
Pt arrived via EMS from [facility] approx 12:25pm.   NIH completed with a score of 25 on arrival. Pt presented with decreased LOC, unable to answer any questions. She was able to follow one command. She had occular motor deficit and visual field cut. She had mild facial droop on the right side. Her left arm was without deficit but right arm had no effort against gravity. She had some tone in the right arm. Right leg had no effort against gravity. Left leg had slight drift. Pt was unaware of being touched on the right side. She was completely mute and had severe right sided neglect.   CT was initiated at 12:34pm. Dr Torres arrived at bedside approx 12:35. Dr Lafleur arrived at bedside approx the same time. CT head without contrast was read around 12:36. Ct angio and perfusion was initiated a few minutes later. CT perfusion read and decision not to take to surgery from Dr Torres. Pt taken to ICU.   Pt was not a TPA candidate based on last known normal >4.5 hours. She was last seen by family at 10pm the night before, and family stated they think she may have been awake around 7am to get her grandchildren off to school. No one was with her in the morning to confirm she was within normal limits. Family states that the kids made it to school and it looks like breakfast was served so they feel like she may have begun her symptoms sometime that morning.

## 2018-11-06 NOTE — SIGNIFICANT NOTE
11/06/18 1446   Rehab Time/Intention   Evaluation Not Performed other (see comments)  (Discussed with RN, pt family deciding on palliative care.  Not appropriate for swallow eval at this time, ST to follow for need for bedside swallow eval. )   Rehab Treatment   Discipline speech language pathologist

## 2018-11-06 NOTE — PROGRESS NOTES
Clinical Pharmacy Services: Medication History    Babita Reyes is a 63 y.o. female presenting to [facility] for Stroke (CMS/[facility]) [I63.9]    She  has no past medical history on file.    Allergies as of 11/05/2018   • (No Known Allergies)       Medication information was obtained from: pharmacy  Preferred Pharmacy   [facility] [id number] [place] [address], [place] - [address] [facility] [address] - [phone]  - [phone] FX   [address] [facility] [place] [address]   Phone: [phone] Fax: [phone]   Not a 24 hour pharmacy; exact hours not known       Prior to Admission Medications     Prescriptions Last Dose Informant Patient Reported? Taking?    amantadine (SYMMETREL) 100 MG capsule  Pharmacy Yes Yes    Take 100 mg by mouth 2 (Two) Times a Day.    amLODIPine (NORVASC) 10 MG tablet  Pharmacy Yes Yes    Take 10 mg by mouth Daily.    folic acid (FOLVITE) 1 MG tablet  Pharmacy Yes Yes    Take 1 mg by mouth Daily.    furosemide (LASIX) 40 MG tablet  Pharmacy Yes Yes    Take 40 mg by mouth Daily.    lacosamide (VIMPAT) 100 MG tablet tablet  Pharmacy Yes Yes    Take 100 mg by mouth 2 (Two) Times a Day.    metoprolol tartrate (LOPRESSOR) 25 MG tablet  Pharmacy Yes Yes    Take 25 mg by mouth 2 (Two) Times a Day.    potassium chloride (MICRO-K) 10 MEQ CR capsule  Pharmacy Yes Yes    Take 10 mEq by mouth Daily.    simvastatin (ZOCOR) 20 MG tablet  Pharmacy Yes Yes    Take 20 mg by mouth Daily.    spironolactone (ALDACTONE) 25 MG tablet  Pharmacy Yes Yes    Take 25 mg by mouth Daily.    warfarin (COUMADIN) 1 MG tablet  Pharmacy Yes Yes    Take 1 mg by mouth Every Other Day. Take 1 mg every other day with warfarin 6 mg tablet. 6 mg tablet is once daily everyday.          Medication notes: Per pharmacy, patient has prescriptions for warfarin 6 mg (directions: take one tablet daily) and warfarin 1 mg (directions: take one tablet every other day with warfarin 6 mg tablet). Patient still  fills the warfarin 1 mg prescription, but has not filled the warfarin 6 mg prescription since April 4th, 2018. Patient may not be compliant with home anticoagulant therapy.    This medication list is complete to the best of my knowledge as of 11/6/2018    Please call if questions.    Pineda Pierre, PharmRAMIRO  11/6/2018 12:11 PM

## 2018-11-06 NOTE — PLAN OF CARE
Problem: Stroke (Ischemic) (Adult)  Goal: Signs and Symptoms of Listed Potential Problems Will be Absent, Minimized or Managed (Stroke)  Outcome: Ongoing (interventions implemented as appropriate)      Problem: Skin Injury Risk (Adult)  Goal: Identify Related Risk Factors and Signs and Symptoms  Outcome: Ongoing (interventions implemented as appropriate)    Goal: Skin Health and Integrity  Outcome: Ongoing (interventions implemented as appropriate)      Problem: Fall Risk (Adult)  Goal: Identify Related Risk Factors and Signs and Symptoms  Outcome: Outcome(s) achieved Date Met: 11/06/18    Goal: Absence of Fall  Outcome: Ongoing (interventions implemented as appropriate)      Problem: Palliative Care (Adult)  Goal: Identify Related Risk Factors and Signs and Symptoms  Outcome: Outcome(s) achieved Date Met: 11/06/18    Goal: Maximized Comfort  Outcome: Ongoing (interventions implemented as appropriate)    Goal: Enhanced Quality of Life  Outcome: Ongoing (interventions implemented as appropriate)      Problem: Patient Care Overview  Goal: Plan of Care Review  Outcome: Ongoing (interventions implemented as appropriate)   11/06/18 1724   Plan of Care Review   Progress no change   OTHER   Outcome Summary Patient transferred to the palliative care unit following goals of care and treatment preferences discussion. Family states goal of care to be comfort and treatment preferences to be geared towards symptom management. Spoke with family members in the room and all voiced agreement with plan of care. The palliative care team will continue to follow for any further needs.      Goal: Individualization and Mutuality  Outcome: Ongoing (interventions implemented as appropriate)    Goal: Discharge Needs Assessment  Outcome: Ongoing (interventions implemented as appropriate)    Goal: Interprofessional Rounds/Family Conf  Outcome: Ongoing (interventions implemented as appropriate)

## 2018-11-06 NOTE — PROGRESS NOTES
"  Neurology Progress Note      Assessment & Plan     1) Massive left MCA/BELKIS stroke secondary to aortic valve replacement and low INR on coumadin. CT this morning shows mild mass effect. Would repeat tomorrow AM as peak swelling should be 48-72 hours post onset with last seen normal 11/4th at 10PM although could have happened during the night.     Probably won't be safe to restart anticoagulation given size of infarct for several more days to possibly weeks depending on occurrence of hemorrhage.  2) Speech/swallow evaluation for nutrition versus PEG tube placement.    Gato Lafleur MD MS  Neurology       Subjective:  Symptoms:  Stable.  No shortness of breath.    Activity level: Impaired due to weakness.      Temp:  [98.3 °F (36.8 °C)-98.8 °F (37.1 °C)] 98.3 °F (36.8 °C)  Heart Rate:  [49-80] 54  Resp:  [14-16] 14  BP: (114-154)/() 139/79  I/O last 3 completed shifts:  In: 1120 [I.V.:1120]  Out: -   No intake/output data recorded.     Objective:  General Appearance:  Comfortable.    Vital signs: (most recent): Blood pressure 139/79, pulse 54, temperature 98.3 °F (36.8 °C), temperature source Oral, resp. rate 14, height 152.4 cm (60\"), weight 63.5 kg (140 lb), SpO2 99 %.  Vital signs are normal.    Lungs:  Normal effort and normal respiratory rate.    Heart: Normal rate.  Regular rhythm.    Skin:  Warm and dry.    Abdomen: Abdomen is soft and non-distended.  Bowel sounds are normal.       Neurologic Exam     Mental Status   Mute, global aphasia but alert and makes eye contact and moves volitionally. Left gaze preference with less blink to right visual field.     Cranial Nerves     CN II   Right visual field deficit: upper temporal and lower temporal quadrant(s)    CN III, IV, VI   Pupils are equal, round, and reactive to light.    CN VII   Right facial weakness: central    Motor Exam     Strength   Right deltoid: 0/5  Right biceps: 0/5  Right triceps: 0/5  Right wrist flexion: 0/5  Right wrist extension: " 0/5  Right interossei: 0/5  Right iliopsoas: 0/5  Right quadriceps: 0/5  Right anterior tibial: 0/5  Right posterior tibial: 0/5Normal spontaneous movement of left side but unable to formally test due to aphasia. Increased tone from yesterday has resolved and now with flaccid tone on right side.     Sensory Exam   Less to no response to noxious stimuli on right side. Grimaces to strong stimulation.     Gait, Coordination, and Reflexes     Reflexes   Right biceps: 3+  Left biceps: 2+  Right patellar: 3+  Left patellar: 2+Upgoing toe on right side, brisker on right side.       Stroke (CMS/[facility])

## 2018-11-06 NOTE — SIGNIFICANT NOTE
11/06/18 1058   Rehab Time/Intention   Evaluation Not Performed other (see comments)  (per Chantell GODINEZ, Pt's family is at bedside, OT will see the pt.  later this afternoon )   Rehab Treatment   Discipline occupational therapist   Recommendation   OT - Next Appointment 11/06/18

## 2018-11-07 NOTE — PROGRESS NOTES
Continued Stay Note  [facility]     Patient Name: Babita Reyes  MRN: [id number]  Today's Date: 11/7/2018    Admit Date: 11/5/2018          Discharge Plan     Row Name 11/07/18 1427       Plan    Plan To be determined.     Plan Comments CCP followed up with family and they desire patient to remain on 4P. GRETCHEN Whittington to speak with family today.  GRETCHEN Rod              Discharge Codes    No documentation.           Bonny Huertas RN

## 2018-11-07 NOTE — PLAN OF CARE
Problem: Stroke (Ischemic) (Adult)  Goal: Signs and Symptoms of Listed Potential Problems Will be Absent, Minimized or Managed (Stroke)  Outcome: Ongoing (interventions implemented as appropriate)      Problem: Skin Injury Risk (Adult)  Goal: Identify Related Risk Factors and Signs and Symptoms  Outcome: Ongoing (interventions implemented as appropriate)    Goal: Skin Health and Integrity  Outcome: Ongoing (interventions implemented as appropriate)      Problem: Fall Risk (Adult)  Goal: Absence of Fall  Outcome: Ongoing (interventions implemented as appropriate)      Problem: Palliative Care (Adult)  Goal: Maximized Comfort  Outcome: Ongoing (interventions implemented as appropriate)    Goal: Enhanced Quality of Life  Outcome: Ongoing (interventions implemented as appropriate)      Problem: Patient Care Overview  Goal: Plan of Care Review  Outcome: Ongoing (interventions implemented as appropriate)   11/07/18 0524   Plan of Care Review   Progress no change   OTHER   Outcome Summary VSS, discussed palliative care with family, son agreed to Ativan 0.5mg for anxiety - pt only needed 1 dose, trying to get out of bed but rested well after Ativan, right side neglect, pt unable to follow commands, son at bedside     Goal: Individualization and Mutuality  Outcome: Ongoing (interventions implemented as appropriate)    Goal: Discharge Needs Assessment  Outcome: Ongoing (interventions implemented as appropriate)    Goal: Interprofessional Rounds/Family Conf  Outcome: Ongoing (interventions implemented as appropriate)

## 2018-11-07 NOTE — CONSULTS
"Spoke with son, Marie, regarding goals of care, palliative care unit, symptom management, and potential hospice scattered bed status.  Son stated goal of care is comfort and voiced understanding of patient's prognosis by stating \"The doctor told me there isn't any other option to help her get better\". I provided psychosocial support and explained the palliative care staff were available for family support and questions if needed.  We discussed the potential need for [facility] to become involved either here or develop a discharge plan if patient were to stabilize for transfer.  We will continue to follow for further needs.  Thank you.   "

## 2018-11-07 NOTE — PROGRESS NOTES
[facility]     Mar/chart reviewed  F/u critical care management.  As per Dr. Lafleur's conversation with family, patient was made palliative care.  She is non verbal, appears to be resting comfortably  Vital Sign Min/Max for last 24 hours  Temp  Min: 98.1 °F (36.7 °C)  Max: 99 °F (37.2 °C)   BP  Min: 144/80  Max: 160/80   Pulse  Min: 58  Max: 81   Resp  Min: 14  Max: 16   SpO2  Min: 96 %  Max: 99 %   No Data Recorded   No Data Recorded     Resting, nl resp effort  Mmm  rrr  No c/c/e      A/P:  1. Acute CVA -- per. Dr. Lafleur prognosis very poor, so family decided for comfort measures only  2. Mechanical heart valve     Discussed with family rationale for now feedings or fluids.  Discussed dying process  Offered support and mentioned that [facility] has resources that might help as well.

## 2018-11-08 PROBLEM — Z51.5 ADMISSION FOR HOSPICE CARE: Status: ACTIVE | Noted: 2018-01-01

## 2018-11-08 NOTE — PLAN OF CARE
Problem: Skin Injury Risk (Adult)  Goal: Skin Health and Integrity  Outcome: Ongoing (interventions implemented as appropriate)      Problem: Palliative Care (Adult)  Goal: Maximized Comfort  Outcome: Ongoing (interventions implemented as appropriate)    Goal: Enhanced Quality of Life  Outcome: Ongoing (interventions implemented as appropriate)      Problem: Patient Care Overview  Goal: Plan of Care Review  Outcome: Ongoing (interventions implemented as appropriate)   11/08/18 9961   Plan of Care Review   Progress declining   OTHER   Outcome Summary Pt medicated x3 with Ativan for restlessness, x1 with Morphine for pain. Adding morphine with the ativan seemed to help pt settle more, rather than just ativan alone. Pt appears comfortable at this time. Plan of care reviewed with son at bedside. Will continue to monitor per comfort care.    Coping/Psychosocial   Plan of Care Reviewed With patient;son     Goal: Individualization and Mutuality  Outcome: Ongoing (interventions implemented as appropriate)    Goal: Discharge Needs Assessment  Outcome: Ongoing (interventions implemented as appropriate)    Goal: Interprofessional Rounds/Family Conf  Outcome: Ongoing (interventions implemented as appropriate)

## 2018-11-08 NOTE — PLAN OF CARE
Problem: Stroke (Ischemic) (Adult)  Goal: Signs and Symptoms of Listed Potential Problems Will be Absent, Minimized or Managed (Stroke)  Outcome: Ongoing (interventions implemented as appropriate)      Problem: Skin Injury Risk (Adult)  Goal: Identify Related Risk Factors and Signs and Symptoms  Outcome: Outcome(s) achieved Date Met: 11/07/18    Goal: Skin Health and Integrity  Outcome: Ongoing (interventions implemented as appropriate)      Problem: Fall Risk (Adult)  Goal: Absence of Fall  Outcome: Ongoing (interventions implemented as appropriate)      Problem: Palliative Care (Adult)  Goal: Maximized Comfort  Outcome: Ongoing (interventions implemented as appropriate)    Goal: Enhanced Quality of Life  Outcome: Ongoing (interventions implemented as appropriate)      Problem: Patient Care Overview  Goal: Plan of Care Review  Outcome: Ongoing (interventions implemented as appropriate)   11/07/18 1641   Plan of Care Review   Progress no change   OTHER   Outcome Summary Patient appears comfortable and hasn't needed PRN medications for symptom management prior to turns. Family seems to be coming to terms with plan of care and their loved one's prognosis. Will continue to monitor per palliative goals of care.    Coping/Psychosocial   Plan of Care Reviewed With patient;family     Goal: Individualization and Mutuality  Outcome: Ongoing (interventions implemented as appropriate)    Goal: Discharge Needs Assessment  Outcome: Ongoing (interventions implemented as appropriate)    Goal: Interprofessional Rounds/Family Conf  Outcome: Ongoing (interventions implemented as appropriate)

## 2018-11-08 NOTE — PROGRESS NOTES
Case Management Discharge Note    Final Note: Admitted to a [facility] scattered bed on 11/8/18. SARAH Anderson RN, CCP    Destination - Selection Complete     Service Request Status Selected Specialties Address Phone Number Fax Number    [facility] Selected Hospice [address], [facility] [id number] [phone]      Durable Medical Equipment     No service has been selected for the patient.      Dialysis/Infusion     No service has been selected for the patient.      Home Medical Care     No service has been selected for the patient.      Social Care     No service has been selected for the patient.             Final Discharge Disposition Code: 51 - hospice medical facility

## 2018-11-08 NOTE — PROGRESS NOTES
Discharge Planning Assessment  [facility]     Patient Name: Babita Reyes  MRN: [id number]  Today's Date: 11/8/2018    Admit Date: 11/8/2018          Discharge Needs Assessment    No documentation.             Discharge Plan     Row Name 11/08/18 1842       Plan    Plan Comments The patient was transferred to [facility] from ICU on 11/6/18 @ 16:12. The patient is palliative. Was admitted to a [facility] scattered bed on 11/8/18. SARAH Anderson RN, CCP.         Destination - Selection Complete     Service Request Status Selected Specialties Address Phone Number Fax Number    [facility] [address] [id number] [phone]      Durable Medical Equipment     No service coordination in this encounter.      Dialysis/Infusion     No service coordination in this encounter.      Home Medical Care     No service coordination in this encounter.      Social Care     No service coordination in this encounter.                Demographic Summary    No documentation.           Functional Status    No documentation.           Psychosocial    No documentation.           Abuse/Neglect    No documentation.           Legal    No documentation.           Substance Abuse    No documentation.           Patient Forms    No documentation.         Elena Anderson RN

## 2018-11-08 NOTE — DISCHARGE SUMMARY
Admit date: 11/5/2018  Discharge date: 11/8/2018    Admission/discharge diagnosis:  1. Acute CVA  2. Mechanical heart valve  3. Encephalopathy due to acute CVA    HPI:  Mrs. Montez is a 62yo AAF with a history of mechanical aortic valve.  She has a history of subdural hematoma some 6 years ago.  She was brought to the ER at [facility] after being found down and unresponsive by daughter.  She was last seen normal 12 hours ago.  Her INR was 1.1 on presentation to ER.  She was transferred here for possible intervention but was found to have no salvagable tissue so no intervention performed.      Hospital course:  Given severity of insult and poor prognosis, palliative care was opted for.    Discharge medications:  Per med rec    Activity:  comfort    Diet:  Comfort    Dispo: hospice scatter bed      Greater than 30 mins spent in discharging patient.

## 2018-11-08 NOTE — CONSULTS
Met with son to explain hospice services. Patient is eligible for hospice benefits, and the plan is to admit to hospice scattered bed. Son is agreeable to placement of llamas catheter, and would like patient to be medicated with turns. Patient is also starting to have audible congestion. Spoke with Dr. Rodarte who is agreeable to complete discharge/readmit today. Thank you for referral and for allowing me to participate in the care of this patient.    Lola Martinez, RN  [facility]  [phone]

## 2018-11-08 NOTE — PLAN OF CARE
Problem: Skin Injury Risk (Adult)  Goal: Skin Health and Integrity  Outcome: Ongoing (interventions implemented as appropriate)      Problem: Patient Care Overview  Goal: Plan of Care Review  Outcome: Ongoing (interventions implemented as appropriate)   11/08/18 1826   Coping/Psychosocial   Plan of Care Reviewed With family   Plan of Care Review   Progress declining   OTHER   Outcome Summary Pt flipped to HSB, premedicate for turns with 1mg ativan & 2mg morphine. F/C placed for comfort. Will monitor for comfort.     Goal: Individualization and Mutuality  Outcome: Ongoing (interventions implemented as appropriate)      Problem: Palliative Care (Adult)  Goal: Maximized Comfort  Outcome: Ongoing (interventions implemented as appropriate)    Goal: Enhanced Quality of Life  Outcome: Ongoing (interventions implemented as appropriate)

## 2018-11-08 NOTE — PROGRESS NOTES
Discharge Planning Assessment  [facility]     Patient Name: Babita Reyes  MRN: [id number]  Today's Date: 11/8/2018    Admit Date: 11/5/2018          Discharge Needs Assessment    No documentation.             Discharge Plan     Row Name 11/08/18 1051       Plan    Plan Comments [facility] to evaluate and meet with the family. CCP will follow for any needs that may arise. SARAH Anderson RN, CCP.         Destination     No service coordination in this encounter.      Durable Medical Equipment     No service coordination in this encounter.      Dialysis/Infusion     No service coordination in this encounter.      Home Medical Care     No service coordination in this encounter.      Social Care     No service coordination in this encounter.                Demographic Summary    No documentation.           Functional Status    No documentation.           Psychosocial    No documentation.           Abuse/Neglect    No documentation.           Legal    No documentation.           Substance Abuse    No documentation.           Patient Forms    No documentation.         Elena Anderson RN

## 2018-11-09 PROBLEM — I63.232 ARTERIAL ISCHEMIC STROKE, ICA (INTERNAL CAROTID ARTERY), LEFT, ACUTE (HCC): Status: ACTIVE | Noted: 2018-01-01

## 2018-11-09 PROBLEM — G93.6 CEREBRAL EDEMA (HCC): Status: ACTIVE | Noted: 2018-01-01

## 2018-11-09 PROBLEM — Z86.79 HX OF SUBDURAL HEMORRHAGE: Status: ACTIVE | Noted: 2018-01-01

## 2018-11-09 PROBLEM — I10 ESSENTIAL HYPERTENSION: Status: ACTIVE | Noted: 2018-01-01

## 2018-11-09 PROBLEM — Z72.0 TOBACCO ABUSE: Status: ACTIVE | Noted: 2018-01-01

## 2018-11-09 PROBLEM — I27.20 PULMONARY HYPERTENSION (HCC): Status: ACTIVE | Noted: 2018-01-01

## 2018-11-09 PROBLEM — Z95.2 S/P AORTIC VALVE REPLACEMENT WITH PROSTHETIC VALVE: Status: ACTIVE | Noted: 2018-01-01

## 2018-11-09 PROBLEM — T82.03XA PERIVALVULAR LEAK OF PROSTHETIC HEART VALVE: Status: ACTIVE | Noted: 2018-01-01

## 2018-11-09 NOTE — PLAN OF CARE
Problem: Skin Injury Risk (Adult)  Goal: Skin Health and Integrity  Outcome: Ongoing (interventions implemented as appropriate)      Problem: Patient Care Overview  Goal: Plan of Care Review  Outcome: Ongoing (interventions implemented as appropriate)   11/08/18 1821 11/08/18 2339 11/09/18 0554   Coping/Psychosocial   Plan of Care Reviewed With --  family --    Plan of Care Review   Progress declining --  --    OTHER   Outcome Summary --  --  Pt rested well with family at bedside. Medicated prior to Q4 turns. Continue comfort care.      Goal: Individualization and Mutuality  Outcome: Ongoing (interventions implemented as appropriate)    Goal: Discharge Needs Assessment  Outcome: Ongoing (interventions implemented as appropriate)      Problem: Palliative Care (Adult)  Goal: Maximized Comfort  Outcome: Ongoing (interventions implemented as appropriate)    Goal: Enhanced Quality of Life  Outcome: Ongoing (interventions implemented as appropriate)

## 2018-11-09 NOTE — CONSULTS
Patient discharged and readmitted as hospice patient.  Dr. Peguero, palliative MD has assumed care.  The palliative care team will work with the hospice team as needed.  Thank you.

## 2018-11-10 NOTE — PLAN OF CARE
Problem: Skin Injury Risk (Adult)  Goal: Skin Health and Integrity  Outcome: Ongoing (interventions implemented as appropriate)      Problem: Patient Care Overview  Goal: Plan of Care Review  Outcome: Ongoing (interventions implemented as appropriate)   11/10/18 1812   Coping/Psychosocial   Plan of Care Reviewed With family   Plan of Care Review   Progress declining   OTHER   Outcome Summary Patient premedicated prior to turns for symptom management. Family visited at bedside and supportive of care. Will continue to monitor per palliative goals of care.      Goal: Individualization and Mutuality  Outcome: Ongoing (interventions implemented as appropriate)    Goal: Discharge Needs Assessment  Outcome: Ongoing (interventions implemented as appropriate)    Goal: Interprofessional Rounds/Family Conf  Outcome: Ongoing (interventions implemented as appropriate)      Problem: Palliative Care (Adult)  Goal: Maximized Comfort  Outcome: Ongoing (interventions implemented as appropriate)    Goal: Enhanced Quality of Life  Outcome: Ongoing (interventions implemented as appropriate)      Problem: Fall Risk (Adult)  Goal: Identify Related Risk Factors and Signs and Symptoms  Outcome: Outcome(s) achieved Date Met: 11/10/18    Goal: Absence of Fall  Outcome: Ongoing (interventions implemented as appropriate)

## 2018-11-10 NOTE — PLAN OF CARE
Problem: Skin Injury Risk (Adult)  Goal: Skin Health and Integrity  Outcome: Ongoing (interventions implemented as appropriate)      Problem: Patient Care Overview  Goal: Plan of Care Review  Outcome: Ongoing (interventions implemented as appropriate)   11/09/18 1934 11/10/18 0508   Coping/Psychosocial   Plan of Care Reviewed With --  family   Plan of Care Review   Progress declining --    OTHER   Outcome Summary --  Pt rested well with no family at bedside. Medicated prior to Q4 turns, no s/s of pain noted. Continue comfort care.      Goal: Individualization and Mutuality  Outcome: Ongoing (interventions implemented as appropriate)    Goal: Discharge Needs Assessment  Outcome: Ongoing (interventions implemented as appropriate)      Problem: Palliative Care (Adult)  Goal: Maximized Comfort  Outcome: Ongoing (interventions implemented as appropriate)    Goal: Enhanced Quality of Life  Outcome: Ongoing (interventions implemented as appropriate)

## 2018-11-10 NOTE — PROGRESS NOTES
[facility] Visit Report    Babita Reyes  [id number]  11/10/2018    [facility]      Reason for [facility] Admission: CVA      Symptom  Management: Resp.distress      Nursing/Medication Recommendations: No recommendations at this time      Psychosocial Issues and Recommendations: Provide support to patient and family      Spiritual Concerns and Recommendations: None at present      [facility] Plans:  None, patient actively dying and not safe for transport. Patient receiving IV medications for symptom management of resp.distress and is meeting criteria for GIP as a [facility] scattered bed patient.      Review of Visit: Close monitoring for safety/falls, management of resp.distress, comfort care for dying patient. Patient lying in bed, unresponsive, breathing shallow, nailbeds cyanotic. No family present. Discussed care needs with staff RN and patient receiving IV Morphine 2mg x 4 doses, IV Ativan 1mg x 4 doses and IV Robinul 0.4mg x 4 doses since midnight and appears comfortable. Will continue to see daily to assess needs, monitor status and offer support.        Shayla Rice RN  [facility] Visit Nurse

## 2018-11-10 NOTE — H&P
Palliative Care/Hospice Admit/Consult Note 11/9/2018      Referring Provider: Jairo Rodarte MD  Reason for Consultation: Hospice Care  Date of Admission:  11/8/2018    Patient Care Team:  Provider, No Known as PCP - Soren Engel MD as Consulting Physician (Hospice and Palliative Medicine)    Chief complaint:  CVA    History of present illness:  The patient is a 63 y.o. female who has a history of mechanical aortic valve.  She has a history of subdural hematoma some 6 years ago.  She was brought to the ER at [facility] after being found down and unresponsive by daughter.  She was last seen normal 12 hours PTA.  Her INR was 1.1 on presentation to ER. She was transferred here for possible intervention but was found to have no salvagable tissue so no intervention performed. Given the severity of her deficit and the lack of improvement, discussions were held and the patient was transferred to palliative care and subsequently discharged and readmitted as a HSB.    At the time of my evaluation, the patient's sister was at bedside. No ROS obtainable from the patient.      Review of Systems  Pertinent items are noted in HPI    Palliative Performance Scale  Palliative Performance Scale Score: 10%    History:  Nothing updated since admision from the transferring hospital.  No past medical history on file., No past surgical history on file., No family history on file. and   Social History   Substance Use Topics   • Smoking status: Not on file   • Smokeless tobacco: Not on file   • Alcohol use Not on file       Vital Signs   Temp:  [99.2 °F (37.3 °C)-100 °F (37.8 °C)] 100 °F (37.8 °C)  Heart Rate:  [102-112] 102  Resp:  [10-12] 12  BP: (121-136)/(78-92) 121/78  Device (Oxygen Therapy): room air SpO2:  [95 %-98 %] 95 %    Physical Exam:     General Appearance:    Not awake and appears in no acute distress   Head:    Normocephalic, without obvious abnormality, atraumatic   Eyes:            Lids and  lashes normal, conjunctivae and sclerae normal, no   icterus   Ears:    Ears appear intact with no abnormalities noted   Throat:   No oral lesions, oral mucosa moist   Neck:   No adenopathy, supple, trachea midline, no thyromegaly   Back:     No scoliosis present   Lungs:     Clear to auscultation, diminished, respirations regular, even and not labored    Heart:    Regular rhythm and normal rate, + click   Breast Exam:    Deferred   Abdomen:     Occasional bowel sounds, soft and non-tender, non-distended   Genitalia:    Deferred   Extremities:   No edema, no cyanosis   Pulses:   Radial pulses palpable and equal bilaterally   Skin:   No bleeding       Neurologic:   Not awake to test       Results Review:   I reviewed the patient's new clinical results.   CT Head with Perfusion:  IMPRESSION:  1.  Findings on CT perfusion most suggestive of large infarct core  involving nearly the entire left middle cerebral artery territory with  little surrounding ischemic penumbra within the left middle cerebral  artery territory. Increased mean transit time and cerebral blood volume  within the bilateral cerebellar hemispheres of indeterminate etiology as  no CT angiogram of the head or neck was performed at this institution or  available for comparison at the time of this dictation. Findings may be  related to mild ischemia in these locations versus compensatory  posterior circulation hyperperfusion. Comparison with outside CTA head  and neck is recommended.    Follow Up CT Head:  Evolving very large left BELKIS/MCA distribution infarct with edema and  mild mass effect but no significant shift or significant hemorrhagic  focus considering patient motion.            Impression:      Arterial ischemic stroke, ICA (internal carotid artery), left, acute (CMS/HCC)    Admission for hospice care    Cerebral edema (CMS/HCC)    S/P aortic valve replacement with prosthetic valve    Tobacco abuse    Perivalvular leak of prosthetic heart valve     Pulmonary hypertension (CMS/HCC)    Essential hypertension    Hx of subdural hemorrhage        Plan:  Very large left sided ischemic CVA. All have determined poor prognosis. The patient has been admitted as a HSB. Comfort measures to be provided and meds adjusted as needed. No CPR and comfort measures.      Soren Peguero MD  Hospice and Palliative Medicine  11/09/18  8:44 PM

## 2018-11-10 NOTE — PROGRESS NOTES
Palliative Care/Hospice Follow Up Note       LOS: 2 days   Patient Care Team:  Provider, No Known as PCP - Soren Engel MD as Consulting Physician (Hospice and Palliative Medicine)    Chief Complaint:  Large CVA    Interval History:     Patient Complaints: None  Patient Denies:  None  History taken from:  Chart    Review of Systems: As above.     Palliative Performance Scale  Palliative Performance Scale Score: 10%  [place] Symptom Assessment System Revised  Pain Score: no pain   ESAS Tiredness Score: Worst possible tiredness  ESAS Nausea Score: No nausea  ESAS Depression Score: unable to assess  ESAS Anxiety Score: No anxiety  ESAS Drowsiness Score: Worst possible drowsiness  ESAS Lack of Appetite Score: Worst lack of appetite  ESAS Wellbeing Score: 7  ESAS Dyspnea Score: 2  ESAS Other Problem Score: unable to assess  ESAS Source of Information: healthcare professional caregiver  ESAS Intervention: medicated/see MAR  ESAS Intervention Response: tolerated    Vital Signs  Temp:  [99.8 °F (37.7 °C)-100 °F (37.8 °C)] 99.8 °F (37.7 °C)  Heart Rate:  [] 91  Resp:  [12-16] 16  BP: (121-125)/(78-84) 125/84  Device (Oxygen Therapy): room air SpO2:  [91 %-95 %] 91 %    Physical Exam:  General Appearance:    Not awake and in no acute distress   Throat:   No oral lesions, oral mucosa moist   Neck:   No adenopathy, supple, trachea midline   Lungs:     Clear to auscultation, respirations diminished, even with episodes of pauses     Heart:    Regular rhythm and normal rate and a click from her mechanical prosthetic valve    Abdomen:     Occasional bowel sounds, no masses, no organomegaly, soft and non-tender, non-distended   Extremities:   No edema, no cyanosis   Pulses:   Radial pulses palpable and equal bilaterally          Results Review:     I reviewed the patient's new clinical results.    Medication Reviewed.    Assessment/Plan       Arterial ischemic stroke, ICA (internal carotid artery), left,  acute (CMS/[facility])    Admission for hospice care    Cerebral edema (CMS/[facility])    S/P aortic valve replacement with prosthetic valve    Tobacco abuse    Perivalvular leak of prosthetic heart valve    Pulmonary hypertension (CMS/[facility])    Essential hypertension    Hx of subdural hemorrhage      No family at bedside.  The patient appears comfortable.  The patient has received glycopyrrolate for airway secretions.  The patient has received 2 doses of 2 mg morphine (6 doses yesterday) and 2 doses of 1 mg Ativan (6 doses yesterday) thus far today.  Continue comfort care.    Plan for disposition:HSB.    oSren Peguero MD  Hospice and Palliative Medicine  11/10/18  8:02 AM

## 2018-11-10 NOTE — PLAN OF CARE
Problem: Patient Care Overview  Goal: Plan of Care Review  Outcome: Ongoing (interventions implemented as appropriate)   11/09/18 1934   Coping/Psychosocial   Plan of Care Reviewed With patient   Plan of Care Review   Progress declining   OTHER   Outcome Summary Pt medicated prior to repositioning d/t pain with turns. Increased congestion, Robinul increased and pt in recovery position with improvement. Family at [facility], [place].

## 2018-11-11 NOTE — PLAN OF CARE
Problem: Skin Injury Risk (Adult)  Goal: Skin Health and Integrity  Outcome: Ongoing (interventions implemented as appropriate)      Problem: Patient Care Overview  Goal: Plan of Care Review  Outcome: Ongoing (interventions implemented as appropriate)   11/11/18 3631   Coping/Psychosocial   Plan of Care Reviewed With patient   Plan of Care Review   Progress no change   OTHER   Outcome Summary Patient premedicated prior to turns for symptom management. No acute changes during shift. Will continue to monitor per palliative goals of care.      Goal: Individualization and Mutuality  Outcome: Ongoing (interventions implemented as appropriate)    Goal: Discharge Needs Assessment  Outcome: Ongoing (interventions implemented as appropriate)    Goal: Interprofessional Rounds/Family Conf  Outcome: Ongoing (interventions implemented as appropriate)      Problem: Palliative Care (Adult)  Goal: Maximized Comfort  Outcome: Ongoing (interventions implemented as appropriate)    Goal: Enhanced Quality of Life  Outcome: Ongoing (interventions implemented as appropriate)      Problem: Fall Risk (Adult)  Goal: Absence of Fall  Outcome: Ongoing (interventions implemented as appropriate)

## 2018-11-11 NOTE — PLAN OF CARE
Problem: Skin Injury Risk (Adult)  Goal: Skin Health and Integrity  Outcome: Ongoing (interventions implemented as appropriate)      Problem: Patient Care Overview  Goal: Plan of Care Review  Outcome: Ongoing (interventions implemented as appropriate)   11/10/18 1819 11/10/18 2206 11/11/18 0619   Coping/Psychosocial   Plan of Care Reviewed With --  family --    Plan of Care Review   Progress declining --  --    OTHER   Outcome Summary --  --  Pt rested well. Medicated prior to Q4 turns. Continue comfort care.      Goal: Individualization and Mutuality  Outcome: Ongoing (interventions implemented as appropriate)    Goal: Discharge Needs Assessment  Outcome: Ongoing (interventions implemented as appropriate)      Problem: Palliative Care (Adult)  Goal: Maximized Comfort  Outcome: Ongoing (interventions implemented as appropriate)    Goal: Enhanced Quality of Life  Outcome: Ongoing (interventions implemented as appropriate)      Problem: Fall Risk (Adult)  Goal: Absence of Fall  Outcome: Ongoing (interventions implemented as appropriate)

## 2018-11-11 NOTE — PROGRESS NOTES
Palliative Care/Hospice Follow Up Note       LOS: 3 days   Patient Care Team:  Provider, No Known as PCP - Soren Engel MD as Consulting Physician (Hospice and Palliative Medicine)    Chief Complaint:  Large CVA    Interval History:     Patient Complaints: None  Patient Denies:  None  History taken from:  Patient's nephew and RN    Review of Systems: As above.     Palliative Performance Scale  Palliative Performance Scale Score: 10%  [place] Symptom Assessment System Revised  Pain Score: no pain   ESAS Tiredness Score: Worst possible tiredness  ESAS Nausea Score: No nausea  ESAS Depression Score: unable to assess  ESAS Anxiety Score: No anxiety  ESAS Drowsiness Score: Worst possible drowsiness  ESAS Lack of Appetite Score: Worst lack of appetite  ESAS Wellbeing Score: 7  ESAS Dyspnea Score: 2  ESAS Other Problem Score: 2(congestion)  ESAS Source of Information: healthcare professional caregiver  ESAS Intervention: medicated/see MAR  ESAS Intervention Response: tolerated    Vital Signs  Temp:  [99.1 °F (37.3 °C)-100 °F (37.8 °C)] 99.1 °F (37.3 °C)  Heart Rate:  [] 91  Resp:  [16-18] 16  BP: (138-147)/(97-99) 147/97  Device (Oxygen Therapy): room air SpO2:  [90 %-94 %] 94 %    Physical Exam:  General Appearance:    Not awake and in no acute distress   Throat:   No oral lesions, oral mucosa moist   Neck:   No adenopathy, supple, trachea midline   Lungs:     Clear to auscultation, respirations diminished, even     Heart:    Regular rhythm and normal rate and a click from her mechanical prosthetic valve    Abdomen:     Occasional bowel sounds, no masses, no organomegaly, soft and non-tender, non-distended   Extremities:   No edema, no cyanosis   Pulses:   Radial pulses palpable and equal bilaterally          Results Review:     I reviewed the patient's new clinical results.    Medication Reviewed.    Assessment/Plan       Arterial ischemic stroke, ICA (internal carotid artery), left, acute  (CMS/[facility])    Admission for hospice care    Cerebral edema (CMS/[facility])    S/P aortic valve replacement with prosthetic valve    Tobacco abuse    Perivalvular leak of prosthetic heart valve    Pulmonary hypertension (CMS/[facility])    Essential hypertension    Hx of subdural hemorrhage      I talked with the patient's nephew at bedside. No specific questions asked. I reviewed with the RN. The patient appears comfortable.  The patient has received glycopyrrolate for airway secretions.  The patient has received 3 doses of 2 mg morphine (6 doses yesterday) and 3 doses of 1 mg Ativan (6 doses yesterday) thus far today.  Continue comfort care.    Plan for disposition:HSB.    Soren Peguero MD  Hospice and Palliative Medicine  11/11/18  11:27 AM

## 2018-11-11 NOTE — PROGRESS NOTES
[facility] Visit Report    Babita Reyes  [id number]  11/11/2018    Admission R/T [facility] Dx: YES      Reason for [facility] Admission: CVA      Symptom  Management: Resp.distress      Nursing/Medication Recommendations: No recommendations at this time      Psychosocial Issues and Recommendations: Provide support to patient and family      Spiritual Concerns and Recommendations: None at present      [facility] Discharge Plans:  None, patient dying and not safe for transport. Patient receiving IV medications for symptom management of resp.distress and is meeting criteria for GIP as a [facility] scattered bed patient.      Review of Visit: Close monitoring for safety/falls, management of resp.distess, comfort care for dying patient. Patient lying in bed, unresponsive, breathing shallow, color ashen. No family present during visit but nephew here earlier per staff. Emotional support provided to patient. Discussed care needs with staff and patient has received IV Morphine 2mg x 5 doses, IV Ativan 1mg x 5 doses and IV Robinul 0.4mg x 5 doses. Will continue to see daily to assess needs, monitor status and offer support.        Shayla Rice RN  [facility] Visit Nurse

## 2018-11-12 NOTE — PLAN OF CARE
Problem: Skin Injury Risk (Adult)  Goal: Skin Health and Integrity  Outcome: Ongoing (interventions implemented as appropriate)      Problem: Patient Care Overview  Goal: Plan of Care Review  Outcome: Ongoing (interventions implemented as appropriate)   11/11/18 1840 11/11/18 2359 11/12/18 0611   Coping/Psychosocial   Plan of Care Reviewed With --  patient --    Plan of Care Review   Progress no change --  --    OTHER   Outcome Summary --  --  Pt rested well with no family at bedside. Medicated prior to Q4 turns. Continue comfort care.      Goal: Individualization and Mutuality  Outcome: Ongoing (interventions implemented as appropriate)    Goal: Discharge Needs Assessment  Outcome: Ongoing (interventions implemented as appropriate)      Problem: Palliative Care (Adult)  Goal: Maximized Comfort  Outcome: Ongoing (interventions implemented as appropriate)    Goal: Enhanced Quality of Life  Outcome: Ongoing (interventions implemented as appropriate)      Problem: Fall Risk (Adult)  Goal: Absence of Fall  Outcome: Ongoing (interventions implemented as appropriate)

## 2018-11-12 NOTE — PROGRESS NOTES
Palliative Care/Hospice Follow Up Note       LOS: 4 days   Patient Care Team:  Provider, No Known as PCP - Soren Engel MD as Consulting Physician (Hospice and Palliative Medicine)    Chief Complaint:  Large CVA    Interval History:     Patient Complaints: None  Patient Denies:  None  History taken from:  Patient's son and RN    Review of Systems: As above.     Palliative Performance Scale  Palliative Performance Scale Score: 10%  [place] Symptom Assessment System Revised  Pain Score: no pain   ESAS Tiredness Score: Worst possible tiredness  ESAS Nausea Score: No nausea  ESAS Depression Score: No depression  ESAS Anxiety Score: No anxiety  ESAS Drowsiness Score: Worst possible drowsiness  ESAS Lack of Appetite Score: Worst lack of appetite  ESAS Wellbeing Score: 2  ESAS Dyspnea Score: 2  ESAS Other Problem Score: 2  ESAS Source of Information: patient, family caregiver, healthcare professional caregiver  ESAS Intervention: medicated/see MAR  ESAS Intervention Response: tolerated    Vital Signs  Temp:  [97.8 °F (36.6 °C)-98.6 °F (37 °C)] 97.8 °F (36.6 °C)  Heart Rate:  [] 92  Resp:  [16] 16  BP: (122-145)/(93-98) 122/93  Device (Oxygen Therapy): room air SpO2:  [94 %-97 %] 94 %    Physical Exam:  General Appearance:    Not awake and in no acute distress, spontaneously moving left hand    Throat:   No oral lesions, oral mucosa moist   Neck:   No adenopathy, supple, trachea midline   Lungs:     Clear to auscultation, respirations diminished, even     Heart:    Regular rhythm and normal rate and a click from her mechanical prosthetic valve    Abdomen:     Occasional bowel sounds, no masses, no organomegaly, soft and non-tender, non-distended   Extremities:   No edema, no cyanosis   Pulses:   Radial pulses palpable and equal bilaterally          Results Review:     I reviewed the patient's new clinical results.    Medication Reviewed.    Assessment/Plan       Arterial ischemic stroke, ICA  (internal carotid artery), left, acute (CMS/[facility])    Admission for hospice care    Cerebral edema (CMS/[facility])    S/P aortic valve replacement with prosthetic valve    Tobacco abuse    Perivalvular leak of prosthetic heart valve    Pulmonary hypertension (CMS/[facility])    Essential hypertension    Hx of subdural hemorrhage      I talked with the patient's son at bedside. I answered his questions. He was torn with the fact that she had not passed yet and should he be doing more for her? I explained the situation to him and her outcome with or without more aggressive intervention. I reviewed with the RN. The patient appears comfortable.  The patient has received glycopyrrolate for airway secretions.  The patient has received 3 doses of 2 mg morphine (6 doses yesterday) and 3 doses of 1 mg Ativan (6 doses yesterday) thus far today.  Continue comfort care.    Plan for disposition:HSB.    Soren Peguero MD  Hospice and Palliative Medicine  11/12/18  4:06 PM

## 2018-11-12 NOTE — PROGRESS NOTES
[facility] Visit Report    Babita Reyes  [id number]  11/12/2018    Admission R/T [facility] Dx: yes    Reason for [facility] Admission:    Symptom  Management: Respiratory Distress    Nursing/Medication Recommendations:    Psychosocial Issues and Recommendations:    Spiritual Concerns and Recommendations:    [facility] Discharge Plans:  incomplete; patient remains [facility] and [facility] will round daily    Review of Visit (Include All Collaboration- including names of hospital and family involved during admission/visit):  patient resting quielty in bed with family at the bedside. patient unresponsive but appears comfortable. respirations even and unlabored. family denies any new concerns. patient recieved robinul v x7, morphien iv x8 and ativan iv x8 in the last 24 hours for symptom managment. no plans to discharge at this time. will cont to visit daily to assess needs and offer suport      Kaley Casey RN

## 2018-11-13 NOTE — PLAN OF CARE
Problem: Skin Injury Risk (Adult)  Goal: Skin Health and Integrity  Outcome: Ongoing (interventions implemented as appropriate)      Problem: Patient Care Overview  Goal: Plan of Care Review  Outcome: Ongoing (interventions implemented as appropriate)      Problem: Palliative Care (Adult)  Goal: Maximized Comfort  Outcome: Ongoing (interventions implemented as appropriate)    Goal: Enhanced Quality of Life  Outcome: Ongoing (interventions implemented as appropriate)      Problem: Fall Risk (Adult)  Goal: Absence of Fall  Outcome: Ongoing (interventions implemented as appropriate)

## 2018-11-13 NOTE — PROGRESS NOTES
Palliative Care/Hospice Follow Up Note       LOS: 5 days   Patient Care Team:  Provider, No Known as PCP - Soren Engel MD as Consulting Physician (Hospice and Palliative Medicine)    Chief Complaint:  Large CVA    Interval History:     Patient Complaints: None  Patient Denies:  None  History taken from:  RN    Review of Systems: As above.     Palliative Performance Scale  Palliative Performance Scale Score: 10%  [place] Symptom Assessment System Revised  Pain Score: no pain   ESAS Tiredness Score: Worst possible tiredness  ESAS Nausea Score: No nausea  ESAS Depression Score: unable to assess  ESAS Anxiety Score: No anxiety  ESAS Drowsiness Score: Worst possible drowsiness  ESAS Lack of Appetite Score: Worst lack of appetite  ESAS Wellbeing Score: 7  ESAS Dyspnea Score: 2  ESAS Other Problem Score: 2(congestion)  ESAS Source of Information: patient  ESAS Intervention: medicated/see MAR  ESAS Intervention Response: tolerated    Vital Signs  Temp:  [97.9 °F (36.6 °C)-98.7 °F (37.1 °C)] 98.7 °F (37.1 °C)  Heart Rate:  [88] 88  Resp:  [10-16] 10  BP: (116-130)/(72-97) 130/97  Device (Oxygen Therapy): room air SpO2:  [93 %-96 %] 93 %    Physical Exam:  General Appearance:    Not awake and in no acute distress, eyes slightly open but not follow commands    Throat:   No oral lesions, oral mucosa moist   Neck:   No adenopathy, supple, trachea midline   Lungs:     Clear to auscultation, respirations diminished, even     Heart:    Regular rhythm and normal rate and a click from her mechanical prosthetic valve    Abdomen:     Occasional bowel sounds, no masses, no organomegaly, soft and non-tender, non-distended   Extremities:   No edema, no cyanosis   Pulses:   Radial pulses palpable and equal bilaterally          Results Review:     I reviewed the patient's new clinical results.    Medication Reviewed.    Assessment/Plan       Arterial ischemic stroke, ICA (internal carotid artery), left, acute (CMS/[facility])     Admission for hospice care    Cerebral edema (CMS/[facility])    S/P aortic valve replacement with prosthetic valve    Tobacco abuse    Perivalvular leak of prosthetic heart valve    Pulmonary hypertension (CMS/[facility])    Essential hypertension    Hx of subdural hemorrhage      No family at bedside and the patient is getting a bath. I reviewed with the RN. The patient appears comfortable.  The patient has received glycopyrrolate for airway secretions.  The patient has received 3 doses of 2 mg morphine (6 doses yesterday) and 2 doses of 1 mg Ativan (6 doses yesterday) thus far today.  Continue comfort care.    Plan for disposition:HSB.    Soren Peguero MD  Hospice and Palliative Medicine  11/13/18  8:04 AM

## 2018-11-13 NOTE — PLAN OF CARE
Problem: Skin Injury Risk (Adult)  Goal: Skin Health and Integrity  Outcome: Ongoing (interventions implemented as appropriate)      Problem: Patient Care Overview  Goal: Plan of Care Review  Outcome: Ongoing (interventions implemented as appropriate)   11/13/18 3128   Coping/Psychosocial   Plan of Care Reviewed With patient   Plan of Care Review   Progress no change   OTHER   Outcome Summary No acute changes during shift. Patient appears comfortable at rest. Patient medicated prior to turns for symptom management. Will continue to monitor per palliative goals of care.      Goal: Individualization and Mutuality  Outcome: Ongoing (interventions implemented as appropriate)    Goal: Discharge Needs Assessment  Outcome: Ongoing (interventions implemented as appropriate)    Goal: Interprofessional Rounds/Family Conf  Outcome: Ongoing (interventions implemented as appropriate)      Problem: Palliative Care (Adult)  Goal: Maximized Comfort  Outcome: Ongoing (interventions implemented as appropriate)    Goal: Enhanced Quality of Life  Outcome: Ongoing (interventions implemented as appropriate)      Problem: Fall Risk (Adult)  Goal: Absence of Fall  Outcome: Ongoing (interventions implemented as appropriate)

## 2018-11-13 NOTE — PROGRESS NOTES
[facility] Visit Report    Babita Reyes  [id number]  11/13/2018    Admission R/T [facility] Dx: yes    Reason for [facility] Admission:    Symptom  Management: Respiratory Distress    Nursing/Medication Recommendations:    Psychosocial Issues and Recommendations:    Spiritual Concerns and Recommendations:    [facility] Discharge Plans:  incomplete; patient remains [facility] and [facility] will round daily    Review of Visit (Include All Collaboration- including names of hospital and family involved during admission/visit):  patient resting quielty in bed with no family at the bedside. patient unresponsive but appears comfortable. respirations even and unlabored. patient recieved robinul 0.4mg iv x4, morphine 2mg iv x9 and ativan 1mg  iv x8 in the last 24 hours for symptom managment. no plans to discharge at this time. will cont to visit daily to assess needs and offer suport      Kaley Casey RN

## 2018-11-14 NOTE — PLAN OF CARE
Problem: Skin Injury Risk (Adult)  Goal: Skin Health and Integrity  Outcome: Ongoing (interventions implemented as appropriate)      Problem: Patient Care Overview  Goal: Plan of Care Review  Outcome: Ongoing (interventions implemented as appropriate)   11/14/18 8245   Coping/Psychosocial   Plan of Care Reviewed With patient;family   Plan of Care Review   Progress no change   OTHER   Outcome Summary Pt resting comfortably. Premedicated prior to repositioning. Sister and son at bedside today. Robinul given x 1 and scop patch applied for congestion. Will continue to monitor.     Goal: Individualization and Mutuality  Outcome: Ongoing (interventions implemented as appropriate)    Goal: Discharge Needs Assessment  Outcome: Ongoing (interventions implemented as appropriate)    Goal: Interprofessional Rounds/Family Conf  Outcome: Ongoing (interventions implemented as appropriate)      Problem: Fall Risk (Adult)  Goal: Absence of Fall  Outcome: Ongoing (interventions implemented as appropriate)      Problem: Dying Patient, Actively (Adult)  Goal: Comfort/Pain Control  Outcome: Ongoing (interventions implemented as appropriate)    Goal: Peace/Preservation of Dignity During the Dying Process  Outcome: Ongoing (interventions implemented as appropriate)

## 2018-11-14 NOTE — PLAN OF CARE
Problem: Skin Injury Risk (Adult)  Goal: Skin Health and Integrity  Outcome: Ongoing (interventions implemented as appropriate)      Problem: Patient Care Overview  Goal: Plan of Care Review  Outcome: Ongoing (interventions implemented as appropriate)   11/14/18 0511   Coping/Psychosocial   Plan of Care Reviewed With patient   Plan of Care Review   Progress declining   OTHER   Outcome Summary PRN meds admin prior to care/repositioning to maintain pt comfort. Pt unresponsive and appears to have rested comfortably through NOC. Continue to monitor and tx per POC and MD orders.        Problem: Fall Risk (Adult)  Goal: Absence of Fall  Outcome: Ongoing (interventions implemented as appropriate)      Problem: Dying Patient, Actively (Adult)  Goal: Identify Related Risk Factors and Signs and Symptoms  Outcome: Outcome(s) achieved Date Met: 11/14/18    Goal: Comfort/Pain Control  Outcome: Ongoing (interventions implemented as appropriate)    Goal: Peace/Preservation of Dignity During the Dying Process  Outcome: Ongoing (interventions implemented as appropriate)

## 2018-11-14 NOTE — CONSULTS
"Spiritual Care: Asked to visit due to sister's ambivalence regarding comfort measures only.  Pt unresponsive, sister Melania and a male family member present.  Processed the heartbreaking decision to choose [facility] care. Sister said \"God gets the final say.\"  I replied \"your gonzalez is crucial to how you experience this.  Would you think that it's possible to have a deep gonzalez and still trust that choosing Hospice is the best choice to care best for Babita?\" to which sister said, \"so what's your point?\"  \"We know it's the right thing but its still hard.\"  Explained that I was trying help them if they were second guessing the [facility] choice.  Empathized with the suddenness of having to face the loss of a loved one.  Offered prayer.  evert Deleon  "

## 2018-11-15 NOTE — PROGRESS NOTES
Pt is unresponsive at the time of this visit. Pt does not respond to touch or voice. Respirations even and unlabored on room air. Skin  warm to the touch. Family at bedside, offered support to the son at bedside. He did not have any questions or concerns at this time. Collaborated with MARLENY Buenrostro about pt condition and she said pt has had no change. Pt appears comfortable and no signs of pain at this time, Pt has received IV Robinul 0.4mg x 5 doses, IV Ativan 1 mg x 4 doses, and IV Morphine 2 mg x 11 doses in the last 24 hours. VS 97.2, , RR 16 /102 and 02 sats 99%. Will continue to make daily visits to monitor pt status, comfort and needs and offer emotional support to the family.

## 2018-11-15 NOTE — PROGRESS NOTES
Palliative Care/Hospice Follow Up Note       LOS: 6 days   Patient Care Team:  Provider, No Known as PCP - Soren Engel MD as Consulting Physician (Hospice and Palliative Medicine)    Chief Complaint:  Large CVA    Interval History:     Patient Complaints: None  Patient Denies:  None  History taken from:  RN    Review of Systems: As above.     Palliative Performance Scale  Palliative Performance Scale Score: 10%  [place] Symptom Assessment System Revised  Pain Score: no pain   ESAS Tiredness Score: Worst possible tiredness  ESAS Nausea Score: No nausea  ESAS Depression Score: unable to assess  ESAS Anxiety Score: No anxiety  ESAS Drowsiness Score: Worst possible drowsiness  ESAS Lack of Appetite Score: Worst lack of appetite  ESAS Wellbeing Score: unable to assess  ESAS Dyspnea Score: No shortness of breath  ESAS Other Problem Score: 6(congestion)  ESAS Source of Information: healthcare professional caregiver  ESAS Intervention: medicated/see MAR(premed for repositioning)  ESAS Intervention Response: tolerated    Vital Signs  Temp:  [97.1 °F (36.2 °C)-97.6 °F (36.4 °C)] 97.6 °F (36.4 °C)  Heart Rate:  [101-106] 106  Resp:  [16] 16  BP: (144-150)/(102-111) 150/111  Device (Oxygen Therapy): room air SpO2:  [98 %-99 %] 98 %    Physical Exam:  General Appearance:    Questionably aroused but not awake and appears in no acute distress   Throat:   No oral lesions, oral mucosa moist   Neck:   No adenopathy, supple, trachea midline   Lungs:     Clear to auscultation, respirations diminished, even     Heart:    Regular rhythm and tachycardia and a click from her mechanical prosthetic valve    Abdomen:     Occasional bowel sounds, soft and non-tender, non-distended   Extremities:   No edema, no cyanosis   Pulses:   Radial pulses palpable and equal bilaterally          Results Review:     I reviewed the patient's new clinical results.    Medication Reviewed.    Assessment/Plan       Arterial ischemic stroke,  ICA (internal carotid artery), left, acute (CMS/[facility])    Admission for hospice care    Cerebral edema (CMS/[facility])    S/P aortic valve replacement with prosthetic valve    Tobacco abuse    Perivalvular leak of prosthetic heart valve    Pulmonary hypertension (CMS/[facility])    Essential hypertension    Hx of subdural hemorrhage      No family at bedside. I reviewed with the RN and the patient was discussed in the interdisciplinary rounds. The patient appears comfortable.  The patient has received glycopyrrolate for airway secretions.  The patient has received 4 doses of 2 mg morphine (5 doses yesterday) and 0 doses of 1 mg Ativan (4 doses yesterday) thus far today.  Continue comfort care.    Plan for disposition:HSB.    Soren Peguero MD  Hospice and Palliative Medicine  11/14/18  7:02 PM

## 2018-11-15 NOTE — PLAN OF CARE
Problem: Skin Injury Risk (Adult)  Goal: Skin Health and Integrity  Outcome: Ongoing (interventions implemented as appropriate)      Problem: Patient Care Overview  Goal: Plan of Care Review  Outcome: Ongoing (interventions implemented as appropriate)   11/15/18 0411   Coping/Psychosocial   Plan of Care Reviewed With patient   Plan of Care Review   Progress no change   OTHER   Outcome Summary Pt medicated x 2 this shift with morphine 2 mgs, she has rested comfortably and tolerated turns well. Opens her eyes when moved but is non-verbal. Will continue to monitor and offer comfort and support per palliative POC.

## 2018-11-16 NOTE — PROGRESS NOTES
Palliative Care/Hospice Follow Up Note       LOS: 7 days   Patient Care Team:  Provider, No Known as PCP - Soren Engel MD as Consulting Physician (Hospice and Palliative Medicine)    Chief Complaint:  Large CVA    Interval History:     Patient Complaints: None  Patient Denies:  None  History taken from:  Son and RN    Review of Systems: As above.     Palliative Performance Scale  Palliative Performance Scale Score: 10%  [place] Symptom Assessment System Revised  Pain Score: no pain   ESAS Tiredness Score: Worst possible tiredness  ESAS Nausea Score: No nausea  ESAS Depression Score: unable to assess  ESAS Anxiety Score: No anxiety  ESAS Drowsiness Score: Worst possible drowsiness  ESAS Lack of Appetite Score: Worst lack of appetite  ESAS Wellbeing Score: unable to assess  ESAS Dyspnea Score: No shortness of breath  ESAS Other Problem Score: unable to assess  ESAS Source of Information: healthcare professional caregiver  ESAS Intervention: medicated/see MAR(premed for repositioning)  ESAS Intervention Response: tolerated    Vital Signs  Temp:  [97.8 °F (36.6 °C)-97.9 °F (36.6 °C)] 97.8 °F (36.6 °C)  Heart Rate:  [93-97] 97  Resp:  [16] 16  BP: (131-140)/(76-92) 140/76  Device (Oxygen Therapy): room air SpO2:  [96 %-98 %] 98 %    Physical Exam:  General Appearance:    Not awake and appears in no acute distress   Throat:   No oral lesions, oral mucosa moist   Neck:   No adenopathy, supple, trachea midline   Lungs:     Clear to auscultation, respirations diminished, even     Heart:    Regular rhythm and regular rate and a click from her mechanical prosthetic valve    Abdomen:     Occasional bowel sounds, soft and non-tender, non-distended   Extremities:   No edema, no cyanosis   Pulses:   Radial pulses palpable and equal bilaterally          Results Review:     I reviewed the patient's new clinical results.    Medication Reviewed.    Assessment/Plan       Arterial ischemic stroke, ICA (internal  carotid artery), left, acute (CMS/[facility])    Admission for hospice care    Cerebral edema (CMS/[facility])    S/P aortic valve replacement with prosthetic valve    Tobacco abuse    Perivalvular leak of prosthetic heart valve    Pulmonary hypertension (CMS/[facility])    Essential hypertension    Hx of subdural hemorrhage      I talked with the patient's son at bedside. Reviewed all and no questions. I reviewed with the RN. The patient appears comfortable.  The patient has received glycopyrrolate for airway secretions.  The patient has received 3 doses of 2 mg morphine (6 doses yesterday) and 0 doses of 1 mg Ativan thus far today.  Continue comfort care.    Plan for disposition:HSB.    Soren Peguero MD  Hospice and Palliative Medicine  11/15/18  7:04 PM

## 2018-11-16 NOTE — PLAN OF CARE
Problem: Skin Injury Risk (Adult)  Goal: Skin Health and Integrity  Outcome: Ongoing (interventions implemented as appropriate)      Problem: Patient Care Overview  Goal: Plan of Care Review  Outcome: Ongoing (interventions implemented as appropriate)   11/16/18 0432   Coping/Psychosocial   Plan of Care Reviewed With patient   Plan of Care Review   Progress no change   OTHER   Outcome Summary Pt medicated with morphine 2 mgs x 2 this shift. Scopolamine  patch in place for congestion. Continues to occasionally open eyes and move left arm but does not appear to be in distress. Will continue to monitor and provide comfort and support per palliative POC.        Problem: Fall Risk (Adult)  Goal: Absence of Fall  Outcome: Ongoing (interventions implemented as appropriate)      Problem: Dying Patient, Actively (Adult)  Goal: Comfort/Pain Control  Outcome: Ongoing (interventions implemented as appropriate)    Goal: Peace/Preservation of Dignity During the Dying Process  Outcome: Ongoing (interventions implemented as appropriate)

## 2018-11-16 NOTE — PLAN OF CARE
Problem: Skin Injury Risk (Adult)  Goal: Skin Health and Integrity  Outcome: Ongoing (interventions implemented as appropriate)      Problem: Patient Care Overview  Goal: Plan of Care Review  Outcome: Ongoing (interventions implemented as appropriate)   11/15/18 1955   Coping/Psychosocial   Plan of Care Reviewed With patient   Plan of Care Review   Progress no change   OTHER   Outcome Summary Pt premedicated prior to repositioning. Robinul added for congestion. Pt opening eyes and moving left arm at times. No s/s of acute distress/discomfort. Will continue to monitor.     Goal: Individualization and Mutuality  Outcome: Ongoing (interventions implemented as appropriate)    Goal: Discharge Needs Assessment  Outcome: Ongoing (interventions implemented as appropriate)    Goal: Interprofessional Rounds/Family Conf  Outcome: Ongoing (interventions implemented as appropriate)      Problem: Fall Risk (Adult)  Goal: Absence of Fall  Outcome: Ongoing (interventions implemented as appropriate)      Problem: Dying Patient, Actively (Adult)  Goal: Comfort/Pain Control  Outcome: Ongoing (interventions implemented as appropriate)    Goal: Peace/Preservation of Dignity During the Dying Process  Outcome: Ongoing (interventions implemented as appropriate)

## 2018-11-16 NOTE — PROGRESS NOTES
Palliative Care/Hospice Follow Up Note       LOS: 8 days   Patient Care Team:  Provider, No Known as PCP - Soren Engel MD as Consulting Physician (Hospice and Palliative Medicine)    Chief Complaint:  Large CVA    Interval History:     Patient Complaints: None  Patient Denies:  None  History taken from:  Older sister and RN    Review of Systems: As above.     Palliative Performance Scale  Palliative Performance Scale Score: 10%  [place] Symptom Assessment System Revised  Pain Score: no pain   ESAS Tiredness Score: Worst possible tiredness  ESAS Nausea Score: No nausea  ESAS Depression Score: unable to assess  ESAS Anxiety Score: No anxiety  ESAS Drowsiness Score: Worst possible drowsiness  ESAS Lack of Appetite Score: Worst lack of appetite  ESAS Wellbeing Score: 7  ESAS Dyspnea Score: 1  ESAS Other Problem Score: 2(congestion)  ESAS Source of Information: healthcare professional caregiver  ESAS Intervention: medicated/see MAR(premed for repositioning)  ESAS Intervention Response: tolerated    Vital Signs  Temp:  [97.8 °F (36.6 °C)-98.3 °F (36.8 °C)] 98.3 °F (36.8 °C)  Heart Rate:  [88-97] 88  Resp:  [16-18] 18  BP: (135-140)/() 135/100  Device (Oxygen Therapy): room air SpO2:  [97 %-98 %] 97 %    Physical Exam:  General Appearance:    Not awake or arouse to name and appears in no acute distress   Throat:   No oral lesions, oral mucosa moist   Neck:   No adenopathy, supple, trachea midline   Lungs:     Clear to auscultation, respirations diminished, even     Heart:    Regular rhythm and regular rate and a click from her mechanical prosthetic valve    Abdomen:     Occasional bowel sounds, soft and non-tender, non-distended   Extremities:   No edema, no cyanosis   Pulses:   Radial pulses palpable and equal bilaterally          Results Review:     I reviewed the patient's new clinical results.    Medication Reviewed.    Assessment/Plan       Arterial ischemic stroke, ICA (internal carotid  artery), left, acute (CMS/HCC)    Admission for hospice care    Cerebral edema (CMS/[facility])    S/P aortic valve replacement with prosthetic valve    Tobacco abuse    Perivalvular leak of prosthetic heart valve    Pulmonary hypertension (CMS/[facility])    Essential hypertension    Hx of subdural hemorrhage      I talked with the patient's oldest sister at bedside. Reviewed all and no questions. I reviewed with the RN. The patient appears comfortable.  The patient has received no glycopyrrolate for airway secretions.  The patient has received 3 doses of 2 mg morphine (4 doses yesterday) and 0 doses of 1 mg Ativan thus far today.  Continue comfort care.    Plan for disposition:HSB.    Soren Peguero MD  Hospice and Palliative Medicine  11/16/18  3:04 PM

## 2018-11-16 NOTE — PLAN OF CARE
Problem: Skin Injury Risk (Adult)  Goal: Skin Health and Integrity  Outcome: Ongoing (interventions implemented as appropriate)      Problem: Patient Care Overview  Goal: Plan of Care Review  Outcome: Ongoing (interventions implemented as appropriate)   11/16/18 1750   Plan of Care Review   Progress no change   OTHER   Outcome Summary No acute changes over shift. Patient medicated prior to turns for symptom management. Will continue to monitor per palliative goals of care.      Goal: Individualization and Mutuality  Outcome: Ongoing (interventions implemented as appropriate)    Goal: Discharge Needs Assessment  Outcome: Ongoing (interventions implemented as appropriate)    Goal: Interprofessional Rounds/Family Conf  Outcome: Ongoing (interventions implemented as appropriate)      Problem: Fall Risk (Adult)  Goal: Absence of Fall  Outcome: Ongoing (interventions implemented as appropriate)      Problem: Dying Patient, Actively (Adult)  Goal: Comfort/Pain Control  Outcome: Ongoing (interventions implemented as appropriate)    Goal: Peace/Preservation of Dignity During the Dying Process  Outcome: Ongoing (interventions implemented as appropriate)

## 2018-11-17 NOTE — PROGRESS NOTES
[facility] Visit Report    Babita Reyes  [id number]  11/17/2018    [facility] Dx: YES      Reason for [facility] Admission: CVA      Symptom  Management: Restlessness      Nursing/Medication Recommendations: No recommendations at this time      Psychosocial Issues and Recommendations: Provide support to patient and family      Spiritual Concerns and Recommendations: None at present      [facility] Discharge Plans:  None, patient actively dying and not safe for transport. Patient receiving IV medications for symptom management of restlessness and is meeting criteria for GIP as a [facility] scattered bed patient.      Review of Visit: Close monitoring for safety/falls, symptom management of restlessness, comfort care for dying patient. Patient lying in bed, unresponsive, color ashen, nailbeds cyanotic. Breathing with periods of apnea. Spoke to sister at bedside regarding imminent status and she is aware and accepting. Emotional support provided. Discussed care needs with staff and patient has received IV Morphine 2mg x 3 doses and IV Robinul 0.4mg x 4 doses since midnight and has a Scopolamine patch in place. Will continue to see daily to assess needs, monitor status and offer support.        Shayla Rice RN  [facility] Visit Nurse

## 2018-11-17 NOTE — PLAN OF CARE
Problem: Patient Care Overview  Goal: Plan of Care Review  Continue symptom management and comfort care   11/16/18 1750 11/16/18 2200 11/17/18 3994   Coping/Psychosocial   Plan of Care Reviewed With --  patient --    Plan of Care Review   Progress no change --  --    OTHER   Outcome Summary --  --  Patient minimally responsive to pain. Medicated Q4 hours prior to turns for comfort. No S&S of pain or anxiety. Will continue to monitor.

## 2018-11-17 NOTE — PROGRESS NOTES
Palliative Care/Hospice Follow Up Note       LOS: 9 days   Patient Care Team:  Provider, No Known as PCP - Soren Engel MD as Consulting Physician (Hospice and Palliative Medicine)    Chief Complaint:  Large CVA    Interval History:     Patient Complaints: None  Patient Denies:  None  History taken from:  RN    Review of Systems: As above.     Palliative Performance Scale  Palliative Performance Scale Score: 10%  [place] Symptom Assessment System Revised  Pain Score: no pain   ESAS Tiredness Score: 9  ESAS Nausea Score: No nausea  ESAS Depression Score: unable to assess  ESAS Anxiety Score: No anxiety  ESAS Drowsiness Score: 9  ESAS Lack of Appetite Score: Worst lack of appetite  ESAS Wellbeing Score: unable to assess  ESAS Dyspnea Score: 4  ESAS Other Problem Score: 8(congestion)  ESAS Source of Information: healthcare professional caregiver  ESAS Intervention: medicated/see MAR, other (see comment)(repositioned on side)  ESAS Intervention Response: tolerated    Vital Signs  Temp:  [97.4 °F (36.3 °C)-98.2 °F (36.8 °C)] 98.2 °F (36.8 °C)  Heart Rate:  [101-102] 101  Resp:  [10-14] 10  BP: (141)/(98) 141/98  Device (Oxygen Therapy): room air SpO2:  [93 %-99 %] 99 %    Physical Exam:  General Appearance:    Not awake and appears in no acute distress   Throat:   No oral lesions, oral mucosa moist, halitosis present    Neck:   No adenopathy, supple, trachea midline   Lungs:     Clear to auscultation, respirations diminished, even     Heart:    Regular rhythm and tachycardia and a click from her mechanical prosthetic valve    Abdomen:     Occasional bowel sounds, soft and non-tender, non-distended   Extremities:   No edema, no cyanosis   Pulses:   Radial pulses palpable and equal bilaterally          Results Review:     I reviewed the patient's new clinical results.    Medication Reviewed.    Assessment/Plan       Arterial ischemic stroke, ICA (internal carotid artery), left, acute (CMS/HCC)     Admission for hospice care    Cerebral edema (CMS/HCC)    S/P aortic valve replacement with prosthetic valve    Tobacco abuse    Perivalvular leak of prosthetic heart valve    Pulmonary hypertension (CMS/HCC)    Essential hypertension    Hx of subdural hemorrhage      No family presently at bedside.  I reviewed with the patient's nurse.  The patient appears comfortable.  The patient has received glycopyrrolate for airway secretions.  The patient has received 3 doses of 2 mg morphine (6 doses yesterday) and 0 doses of Ativan thus far today.  Continue comfort care.    Plan for disposition:HSB.    Soren Peguero MD  Hospice and Palliative Medicine  11/17/18  3:24 PM

## 2018-11-18 NOTE — PLAN OF CARE
Problem: Skin Injury Risk (Adult)  Goal: Skin Health and Integrity  Outcome: Ongoing (interventions implemented as appropriate)      Problem: Patient Care Overview  Goal: Plan of Care Review  Outcome: Ongoing (interventions implemented as appropriate)   11/17/18 1954   Coping/Psychosocial   Plan of Care Reviewed With patient   Plan of Care Review   Progress no change   OTHER   Outcome Summary Pt medicated x 2 today for resp. distress and congestion. Sister at bedside today. Sister concerned that pt may become addicted to pain medications. Educated sister on morphine indication and s/s of distress. Will continue to monitor.     Goal: Individualization and Mutuality  Outcome: Ongoing (interventions implemented as appropriate)    Goal: Discharge Needs Assessment  Outcome: Ongoing (interventions implemented as appropriate)    Goal: Interprofessional Rounds/Family Conf  Outcome: Ongoing (interventions implemented as appropriate)      Problem: Fall Risk (Adult)  Goal: Absence of Fall  Outcome: Ongoing (interventions implemented as appropriate)      Problem: Dying Patient, Actively (Adult)  Goal: Comfort/Pain Control  Outcome: Ongoing (interventions implemented as appropriate)    Goal: Peace/Preservation of Dignity During the Dying Process  Outcome: Ongoing (interventions implemented as appropriate)

## 2018-11-18 NOTE — PLAN OF CARE
Problem: Patient Care Overview  Goal: Plan of Care Review  Continue symptom management and comfort care.   11/18/18 1816   OTHER   Outcome Summary No change in patient condition. Medicated Q4 hours prior to turns per family request. Will continue to monitor.

## 2018-11-18 NOTE — PROGRESS NOTES
Palliative Care/Hospice Follow Up Note       LOS: 10 days   Patient Care Team:  Provider, No Known as PCP - Soren Engel MD as Consulting Physician (Hospice and Palliative Medicine)    Chief Complaint:  Large CVA    Interval History:     Patient Complaints: None  Patient Denies:  None  History taken from:  RN    Review of Systems: As above.     Palliative Performance Scale  Palliative Performance Scale Score: 10%  [place] Symptom Assessment System Revised  Pain Score: no pain   ESAS Tiredness Score: Worst possible tiredness  ESAS Nausea Score: No nausea  ESAS Depression Score: unable to assess  ESAS Anxiety Score: No anxiety  ESAS Drowsiness Score: Worst possible drowsiness  ESAS Lack of Appetite Score: Worst lack of appetite  ESAS Wellbeing Score: unable to assess  ESAS Dyspnea Score: No shortness of breath  ESAS Other Problem Score: 5  ESAS Source of Information: healthcare professional caregiver  ESAS Intervention: medicated/see MAR(premed for repositioning)  ESAS Intervention Response: tolerated    Vital Signs  Temp:  [97 °F (36.1 °C)-98.9 °F (37.2 °C)] 97 °F (36.1 °C)  Heart Rate:  [104-107] 104  Resp:  [6-16] 6  BP: (104)/(75) 104/75  Device (Oxygen Therapy): room air SpO2:  [89 %-96 %] 89 %    Physical Exam:  General Appearance:    Not awake and appears in no acute distress   Throat:   No oral lesions, oral mucosa moist, halitosis smell not nearly as present today    Neck:   No adenopathy, supple, trachea midline   Lungs:     Clear to auscultation, respirations diminished, even     Heart:    Regular rhythm and tachycardia and a click from her mechanical prosthetic valve    Abdomen:     Occasional bowel sounds, soft and non-tender, non-distended   Extremities:   No edema, no cyanosis   Pulses:   Radial pulses palpable and equal bilaterally          Results Review:     I reviewed the patient's new clinical results.    Medication Reviewed.    Assessment/Plan       Arterial ischemic stroke, ICA  (internal carotid artery), left, acute (CMS/[facility])    Admission for hospice care    Cerebral edema (CMS/[facility])    S/P aortic valve replacement with prosthetic valve    Tobacco abuse    Perivalvular leak of prosthetic heart valve    Pulmonary hypertension (CMS/[facility])    Essential hypertension    Hx of subdural hemorrhage      I reviewed with the patient's son at bedside.  I reviewed with the patient's nurse.  The patient appears comfortable.  The patient has received glycopyrrolate for airway secretions.  The patient has received 3 doses of 2 mg morphine (4 doses yesterday) and 0 doses of Ativan thus far today.  Continue comfort care.    Plan for disposition:HSB.    Soren Peguero MD  Hospice and Palliative Medicine  11/18/18  10:53 AM
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
## 2018-11-19 NOTE — PROGRESS NOTES
Case Management Discharge Note    Final Note: The patient  at 2018 at 00:48. -WHIT Messer    Destination - Selection Complete      Service Provider Request Status Selected Services Address Phone Number Fax Number    [facility] [address] [facility] [id number] [phone]      Durable Medical Equipment      No service has been selected for the patient.      Dialysis/Infusion      No service has been selected for the patient.      Home Medical Care      No service has been selected for the patient.      Community Resources      No service has been selected for the patient.             Final Discharge Disposition Code: 41 -  in medical facility

## 2018-11-20 NOTE — DISCHARGE SUMMARY
Discharge As      Date of Admisssion:  2018  Date of Death:  2018  Time of Death:  12:48 AM    Patient Care Team:  Provider, No Known as PCP - Soren Engel MD as Consulting Physician (Hospice and Palliative Medicine)    Final Diagnosis:     Arterial ischemic stroke, ICA (internal carotid artery), left, acute (CMS/[facility])    Admission for hospice care    Cerebral edema (CMS/[facility])    S/P aortic valve replacement with prosthetic valve    Tobacco abuse    Perivalvular leak of prosthetic heart valve    Pulmonary hypertension (CMS/[facility])    Essential hypertension    Hx of subdural hemorrhage    Death in hospice          Presenting Problem/History of Present Illness  Admission for hospice care [Z51.5]      Hospital Course  Patient was a 63 y.o. female who has a history of mechanical aortic valve.  She has a history of subdural hematoma some 6 years ago.  She was brought to the ER at [facility] after being found down and unresponsive by daughter.  She was last seen normal 12 hours PTA.  Her INR was 1.1 on presentation to ER. She was transferred here for possible intervention but was found to have no salvagable tissue so no intervention performed. Given the severity of her deficit and the lack of improvement, discussions were held and the patient was transferred to palliative care and subsequently discharged and readmitted as a HSB. Symptom management provided. I talked with the son frequently about his mother's condition. I was called that she passed away at 0048 hours.      Soren Peguero MD  Hospice and Palliative Medicine  18  8:08 PM

## 2024-01-24 NOTE — PLAN OF CARE
Problem: Skin Injury Risk (Adult)  Goal: Skin Health and Integrity  Outcome: Ongoing (interventions implemented as appropriate)      Problem: Patient Care Overview  Goal: Plan of Care Review  Outcome: Ongoing (interventions implemented as appropriate)   11/17/18 1954 11/18/18 0817 11/18/18 1858   Coping/Psychosocial   Plan of Care Reviewed With --  patient --    Plan of Care Review   Progress no change --  --    OTHER   Outcome Summary --  --  No significant changes. Pt continues to decline. Premedicated prior to repositioning.     Goal: Individualization and Mutuality  Outcome: Ongoing (interventions implemented as appropriate)    Goal: Discharge Needs Assessment  Outcome: Ongoing (interventions implemented as appropriate)    Goal: Interprofessional Rounds/Family Conf  Outcome: Ongoing (interventions implemented as appropriate)      Problem: Fall Risk (Adult)  Goal: Absence of Fall  Outcome: Ongoing (interventions implemented as appropriate)      Problem: Dying Patient, Actively (Adult)  Goal: Comfort/Pain Control  Outcome: Ongoing (interventions implemented as appropriate)    Goal: Peace/Preservation of Dignity During the Dying Process  Outcome: Ongoing (interventions implemented as appropriate)         show

## 2024-09-11 NOTE — CONSULTS
Home Health to Eval and Treat based on initial evaluation.   Spiritual Care: Patient sleeping; spoke with two sisters- Melania and Devi who told me that the patient is Faith in her beliefs.  I asked them how they were doing during this time and they said that they are feeling alright. The sisters know that I am available to talk with them and support them and pray with them if they would like me to do so; but they were in the middle of a conversation when I entered the room and it seemed that they wanted to resume their conversation. I told them I would be available if they needed me. Will follow up with the patient and family.  Chaplain Ronna Garcia